# Patient Record
Sex: FEMALE | Race: BLACK OR AFRICAN AMERICAN | NOT HISPANIC OR LATINO | Employment: UNEMPLOYED | URBAN - METROPOLITAN AREA
[De-identification: names, ages, dates, MRNs, and addresses within clinical notes are randomized per-mention and may not be internally consistent; named-entity substitution may affect disease eponyms.]

---

## 2023-02-20 LAB
ALANINE AMINOTRANSFERASE (SGPT) (U/L) IN SER/PLAS: 16 U/L (ref 7–45)
ALBUMIN (G/DL) IN SER/PLAS: 4.2 G/DL (ref 3.4–5)
ALKALINE PHOSPHATASE (U/L) IN SER/PLAS: 80 U/L (ref 33–136)
ANION GAP IN SER/PLAS: 10 MMOL/L (ref 10–20)
ASPARTATE AMINOTRANSFERASE (SGOT) (U/L) IN SER/PLAS: 15 U/L (ref 9–39)
BASOPHILS (10*3/UL) IN BLOOD BY AUTOMATED COUNT: 0.01 X10E9/L (ref 0–0.1)
BASOPHILS/100 LEUKOCYTES IN BLOOD BY AUTOMATED COUNT: 0.2 % (ref 0–2)
BILIRUBIN TOTAL (MG/DL) IN SER/PLAS: 0.5 MG/DL (ref 0–1.2)
CALCIUM (MG/DL) IN SER/PLAS: 10.5 MG/DL (ref 8.6–10.6)
CARBON DIOXIDE, TOTAL (MMOL/L) IN SER/PLAS: 31 MMOL/L (ref 21–32)
CHLORIDE (MMOL/L) IN SER/PLAS: 105 MMOL/L (ref 98–107)
CREATININE (MG/DL) IN SER/PLAS: 0.77 MG/DL (ref 0.5–1.05)
EOSINOPHILS (10*3/UL) IN BLOOD BY AUTOMATED COUNT: 0 X10E9/L (ref 0–0.7)
EOSINOPHILS/100 LEUKOCYTES IN BLOOD BY AUTOMATED COUNT: 0 % (ref 0–6)
ERYTHROCYTE DISTRIBUTION WIDTH (RATIO) BY AUTOMATED COUNT: 13.3 % (ref 11.5–14.5)
ERYTHROCYTE MEAN CORPUSCULAR HEMOGLOBIN CONCENTRATION (G/DL) BY AUTOMATED: 33.2 G/DL (ref 32–36)
ERYTHROCYTE MEAN CORPUSCULAR VOLUME (FL) BY AUTOMATED COUNT: 95 FL (ref 80–100)
ERYTHROCYTES (10*6/UL) IN BLOOD BY AUTOMATED COUNT: 4.45 X10E12/L (ref 4–5.2)
GFR FEMALE: 85 ML/MIN/1.73M2
GLUCOSE (MG/DL) IN SER/PLAS: 96 MG/DL (ref 74–99)
HEMATOCRIT (%) IN BLOOD BY AUTOMATED COUNT: 42.2 % (ref 36–46)
HEMOGLOBIN (G/DL) IN BLOOD: 14 G/DL (ref 12–16)
IMMATURE GRANULOCYTES/100 LEUKOCYTES IN BLOOD BY AUTOMATED COUNT: 0.2 % (ref 0–0.9)
LEUKOCYTES (10*3/UL) IN BLOOD BY AUTOMATED COUNT: 5.1 X10E9/L (ref 4.4–11.3)
LYMPHOCYTES (10*3/UL) IN BLOOD BY AUTOMATED COUNT: 1.67 X10E9/L (ref 1.2–4.8)
LYMPHOCYTES/100 LEUKOCYTES IN BLOOD BY AUTOMATED COUNT: 32.8 % (ref 13–44)
MONOCYTES (10*3/UL) IN BLOOD BY AUTOMATED COUNT: 0.48 X10E9/L (ref 0.1–1)
MONOCYTES/100 LEUKOCYTES IN BLOOD BY AUTOMATED COUNT: 9.4 % (ref 2–10)
NEUTROPHILS (10*3/UL) IN BLOOD BY AUTOMATED COUNT: 2.92 X10E9/L (ref 1.2–7.7)
NEUTROPHILS/100 LEUKOCYTES IN BLOOD BY AUTOMATED COUNT: 57.4 % (ref 40–80)
NRBC (PER 100 WBCS) BY AUTOMATED COUNT: 0 /100 WBC (ref 0–0)
PLATELETS (10*3/UL) IN BLOOD AUTOMATED COUNT: 225 X10E9/L (ref 150–450)
POTASSIUM (MMOL/L) IN SER/PLAS: 4.4 MMOL/L (ref 3.5–5.3)
PROTEIN TOTAL: 7.3 G/DL (ref 6.4–8.2)
SODIUM (MMOL/L) IN SER/PLAS: 142 MMOL/L (ref 136–145)
UREA NITROGEN (MG/DL) IN SER/PLAS: 12 MG/DL (ref 6–23)

## 2023-04-17 DIAGNOSIS — E78.5 HYPERLIPIDEMIA, UNSPECIFIED HYPERLIPIDEMIA TYPE: ICD-10-CM

## 2023-04-17 DIAGNOSIS — Z00.00 HEALTHCARE MAINTENANCE: ICD-10-CM

## 2023-04-17 RX ORDER — CELECOXIB 200 MG/1
1 CAPSULE ORAL DAILY
COMMUNITY
Start: 2019-07-24 | End: 2023-04-17 | Stop reason: SDUPTHER

## 2023-04-17 RX ORDER — ATORVASTATIN CALCIUM 40 MG/1
40 TABLET, FILM COATED ORAL DAILY
Qty: 90 TABLET | Refills: 1 | Status: SHIPPED | OUTPATIENT
Start: 2023-04-17 | End: 2023-10-06

## 2023-04-17 RX ORDER — CELECOXIB 200 MG/1
200 CAPSULE ORAL DAILY
Qty: 90 CAPSULE | Refills: 1 | Status: SHIPPED | OUTPATIENT
Start: 2023-04-17 | End: 2023-10-06

## 2023-04-17 RX ORDER — ATORVASTATIN CALCIUM 40 MG/1
1 TABLET, FILM COATED ORAL DAILY
COMMUNITY
Start: 2019-11-01 | End: 2023-04-17 | Stop reason: SDUPTHER

## 2023-08-02 ENCOUNTER — APPOINTMENT (OUTPATIENT)
Dept: PRIMARY CARE | Facility: CLINIC | Age: 68
End: 2023-08-02

## 2023-09-12 ENCOUNTER — TELEMEDICINE (OUTPATIENT)
Dept: PRIMARY CARE | Facility: CLINIC | Age: 68
End: 2023-09-12
Payer: COMMERCIAL

## 2023-09-12 DIAGNOSIS — S13.4XXA WHIPLASH INJURY TO NECK, INITIAL ENCOUNTER: Primary | ICD-10-CM

## 2023-09-12 PROBLEM — F32.A DEPRESSION: Status: ACTIVE | Noted: 2019-02-26

## 2023-09-12 PROBLEM — I10 BENIGN ESSENTIAL HYPERTENSION: Status: ACTIVE | Noted: 2023-09-12

## 2023-09-12 PROBLEM — F17.200 TOBACCO USE DISORDER: Status: ACTIVE | Noted: 2019-02-26

## 2023-09-12 PROBLEM — F90.9 ATTENTION DEFICIT HYPERACTIVITY DISORDER: Status: ACTIVE | Noted: 2019-02-26

## 2023-09-12 PROCEDURE — 99442 PR PHYS/QHP TELEPHONE EVALUATION 11-20 MIN: CPT | Performed by: STUDENT IN AN ORGANIZED HEALTH CARE EDUCATION/TRAINING PROGRAM

## 2023-09-12 RX ORDER — BENZTROPINE MESYLATE 1 MG/1
TABLET ORAL
COMMUNITY

## 2023-09-12 RX ORDER — CYCLOBENZAPRINE HCL 10 MG
TABLET ORAL
COMMUNITY
Start: 2020-12-11 | End: 2023-09-12 | Stop reason: DRUGHIGH

## 2023-09-12 RX ORDER — HALOPERIDOL DECANOATE 100 MG/ML
INJECTION INTRAMUSCULAR
COMMUNITY

## 2023-09-12 RX ORDER — CYCLOBENZAPRINE HCL 10 MG
10 TABLET ORAL 3 TIMES DAILY PRN
Qty: 30 TABLET | Refills: 3 | Status: SHIPPED | OUTPATIENT
Start: 2023-09-12 | End: 2024-01-11 | Stop reason: SDUPTHER
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
## 2023-10-05 DIAGNOSIS — Z00.00 HEALTHCARE MAINTENANCE: ICD-10-CM

## 2023-10-05 DIAGNOSIS — E78.5 HYPERLIPIDEMIA, UNSPECIFIED HYPERLIPIDEMIA TYPE: ICD-10-CM

## 2023-10-06 DIAGNOSIS — E78.5 HYPERLIPIDEMIA, UNSPECIFIED HYPERLIPIDEMIA TYPE: ICD-10-CM

## 2023-10-06 DIAGNOSIS — Z00.00 HEALTHCARE MAINTENANCE: ICD-10-CM

## 2023-10-06 RX ORDER — ATORVASTATIN CALCIUM 40 MG/1
40 TABLET, FILM COATED ORAL DAILY
Qty: 30 TABLET | Refills: 10 | Status: SHIPPED | OUTPATIENT
Start: 2023-10-06 | End: 2023-10-09

## 2023-10-06 RX ORDER — CELECOXIB 200 MG/1
200 CAPSULE ORAL DAILY
Qty: 30 CAPSULE | Refills: 10 | Status: SHIPPED | OUTPATIENT
Start: 2023-10-06 | End: 2023-10-09

## 2023-10-09 RX ORDER — ATORVASTATIN CALCIUM 40 MG/1
40 TABLET, FILM COATED ORAL DAILY
Qty: 90 TABLET | Refills: 3 | Status: SHIPPED | OUTPATIENT
Start: 2023-10-09

## 2023-10-09 RX ORDER — CELECOXIB 200 MG/1
200 CAPSULE ORAL DAILY
Qty: 90 CAPSULE | Refills: 3 | Status: SHIPPED | OUTPATIENT
Start: 2023-10-09

## 2023-10-18 ENCOUNTER — OFFICE VISIT (OUTPATIENT)
Dept: PRIMARY CARE | Facility: CLINIC | Age: 68
End: 2023-10-18
Payer: COMMERCIAL

## 2023-10-18 VITALS
DIASTOLIC BLOOD PRESSURE: 88 MMHG | TEMPERATURE: 97.5 F | OXYGEN SATURATION: 95 % | SYSTOLIC BLOOD PRESSURE: 134 MMHG | WEIGHT: 177 LBS | HEART RATE: 75 BPM | BODY MASS INDEX: 27.72 KG/M2

## 2023-10-18 DIAGNOSIS — S13.4XXA WHIPLASH INJURY TO NECK, INITIAL ENCOUNTER: Primary | ICD-10-CM
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
## 2023-10-18 PROCEDURE — 1125F AMNT PAIN NOTED PAIN PRSNT: CPT | Performed by: STUDENT IN AN ORGANIZED HEALTH CARE EDUCATION/TRAINING PROGRAM

## 2023-10-18 PROCEDURE — 3079F DIAST BP 80-89 MM HG: CPT | Performed by: STUDENT IN AN ORGANIZED HEALTH CARE EDUCATION/TRAINING PROGRAM

## 2023-10-18 PROCEDURE — 4004F PT TOBACCO SCREEN RCVD TLK: CPT | Performed by: STUDENT IN AN ORGANIZED HEALTH CARE EDUCATION/TRAINING PROGRAM

## 2023-10-18 PROCEDURE — 99214 OFFICE O/P EST MOD 30 MIN: CPT | Performed by: STUDENT IN AN ORGANIZED HEALTH CARE EDUCATION/TRAINING PROGRAM

## 2023-10-18 PROCEDURE — 3075F SYST BP GE 130 - 139MM HG: CPT | Performed by: STUDENT IN AN ORGANIZED HEALTH CARE EDUCATION/TRAINING PROGRAM

## 2023-10-18 RX ORDER — METHYLPREDNISOLONE 4 MG/1
TABLET ORAL
Qty: 21 TABLET | Refills: 0 | Status: SHIPPED | OUTPATIENT
Start: 2023-10-18 | End: 2023-10-25
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
## 2023-11-14 ENCOUNTER — ALLIED HEALTH (OUTPATIENT)
Dept: INTEGRATIVE MEDICINE | Facility: CLINIC | Age: 68
End: 2023-11-14

## 2023-11-14 PROCEDURE — MASS(60M) MASSAGE 60 MINUTES: Performed by: MASSAGE THERAPIST
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
## 2023-11-21 ENCOUNTER — ANCILLARY PROCEDURE (OUTPATIENT)
Dept: RADIOLOGY | Facility: CLINIC | Age: 68
End: 2023-11-21
Payer: COMMERCIAL

## 2023-11-21 DIAGNOSIS — M54.16 LUMBAR RADICULOPATHY: ICD-10-CM

## 2023-11-21 PROCEDURE — 72100 X-RAY EXAM L-S SPINE 2/3 VWS: CPT | Performed by: RADIOLOGY

## 2023-11-21 PROCEDURE — 72100 X-RAY EXAM L-S SPINE 2/3 VWS: CPT | Mod: FY

## 2023-12-04 ENCOUNTER — APPOINTMENT (OUTPATIENT)
Dept: INTEGRATIVE MEDICINE | Facility: CLINIC | Age: 68
End: 2023-12-04

## 2023-12-05 ENCOUNTER — TELEMEDICINE (OUTPATIENT)
Dept: INTEGRATIVE MEDICINE | Facility: CLINIC | Age: 68
End: 2023-12-05
Payer: COMMERCIAL

## 2023-12-05 DIAGNOSIS — M54.9 OTHER CHRONIC BACK PAIN: Primary | ICD-10-CM

## 2023-12-05 DIAGNOSIS — G89.29 OTHER CHRONIC BACK PAIN: Primary | ICD-10-CM

## 2023-12-05 PROCEDURE — 99213 OFFICE O/P EST LOW 20 MIN: CPT | Performed by: PHYSICIAN ASSISTANT
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
## 2023-12-11 ENCOUNTER — ALLIED HEALTH (OUTPATIENT)
Dept: INTEGRATIVE MEDICINE | Facility: CLINIC | Age: 68
End: 2023-12-11
Payer: COMMERCIAL

## 2023-12-11 DIAGNOSIS — M54.59 OTHER LOW BACK PAIN: Primary | ICD-10-CM

## 2023-12-11 PROCEDURE — 97810 ACUP 1/> WO ESTIM 1ST 15 MIN: CPT | Performed by: ACUPUNCTURIST

## 2023-12-11 PROCEDURE — 99202 OFFICE O/P NEW SF 15 MIN: CPT | Performed by: ACUPUNCTURIST

## 2023-12-11 PROCEDURE — 97811 ACUP 1/> W/O ESTIM EA ADD 15: CPT | Performed by: ACUPUNCTURIST

## 2023-12-11 ASSESSMENT — ENCOUNTER SYMPTOMS: BACK PAIN: 1
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
## 2023-12-12 ENCOUNTER — ALLIED HEALTH (OUTPATIENT)
Dept: INTEGRATIVE MEDICINE | Facility: CLINIC | Age: 68
End: 2023-12-12

## 2023-12-12 PROCEDURE — MASS60G MASSAGE 60 MINUTES: Performed by: MASSAGE THERAPIST

## 2023-12-12 NOTE — PROGRESS NOTES
Massage Therapy Visit:     Natalie Moore was self-referred.    Condition of Client Subjective :  Patient ID: Natalie Moore is a 68 y.o. female who presents for reason for visit of rt side shoulder and low back discomfort .    [facility] Sept 2022.    Session Information  Visit Type: Follow-up visit  Description of present complaint: Muscle tension      Objective   Pre-treatment Assessment  Arrival Mode: Ambulatory  Treatment Precautions: None      Actions Assessment/Plan :  Provider reviewed plan for the massage session, precautions and contraindications. Patient/guardian/hospital staff has given consent to treat with full understanding of what to expect during the session. Before massage therapy began, provider explained to the patient to communicate at any time if the pressure was causing discomfort past their tolerance level. Patient agreed to advise therapist.    Massage Treatment  Denies Allergy: Patient denies allergy to topical lubricant  Patient Position: Table, Supine, Prone  Positioning Assistance: Pillow(s)/bolster under knees while supine, Pillow(s)/bolster under anles while prone  Massage Technique: Therapeutic massage  Pressure Scale: 3 - Medium pressure    Response:   Palpated inflammation at left shoulder joint during treatment. Pt noted increased shoulder ROM after treatment.     Evaluation:

## 2024-01-09 ENCOUNTER — ALLIED HEALTH (OUTPATIENT)
Dept: INTEGRATIVE MEDICINE | Facility: CLINIC | Age: 69
End: 2024-01-09
Payer: MEDICARE

## 2024-01-09 DIAGNOSIS — G89.29 OTHER CHRONIC BACK PAIN: ICD-10-CM

## 2024-01-09 DIAGNOSIS — M54.9 OTHER CHRONIC BACK PAIN: ICD-10-CM

## 2024-01-09 PROCEDURE — 97810 ACUP 1/> WO ESTIM 1ST 15 MIN: CPT | Performed by: ACUPUNCTURIST

## 2024-01-09 PROCEDURE — 97811 ACUP 1/> W/O ESTIM EA ADD 15: CPT | Performed by: ACUPUNCTURIST

## 2024-01-09 ASSESSMENT — ENCOUNTER SYMPTOMS: BACK PAIN: 1
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
## 2024-01-11 DIAGNOSIS — S13.4XXA WHIPLASH INJURY TO NECK, INITIAL ENCOUNTER: ICD-10-CM

## 2024-01-11 RX ORDER — CYCLOBENZAPRINE HCL 10 MG
10 TABLET ORAL 3 TIMES DAILY PRN
Qty: 30 TABLET | Refills: 3 | Status: SHIPPED | OUTPATIENT
Start: 2024-01-11 | End: 2024-04-04

## 2024-01-15 ENCOUNTER — APPOINTMENT (OUTPATIENT)
Dept: INTEGRATIVE MEDICINE | Facility: CLINIC | Age: 69
End: 2024-01-15
Payer: MEDICARE

## 2024-01-23 ENCOUNTER — ALLIED HEALTH (OUTPATIENT)
Dept: INTEGRATIVE MEDICINE | Facility: CLINIC | Age: 69
End: 2024-01-23
Payer: MEDICARE

## 2024-01-23 DIAGNOSIS — M54.59 OTHER LOW BACK PAIN: Primary | ICD-10-CM

## 2024-01-23 PROCEDURE — 97811 ACUP 1/> W/O ESTIM EA ADD 15: CPT | Performed by: ACUPUNCTURIST

## 2024-01-23 PROCEDURE — 97810 ACUP 1/> WO ESTIM 1ST 15 MIN: CPT | Performed by: ACUPUNCTURIST

## 2024-01-23 ASSESSMENT — ENCOUNTER SYMPTOMS: BACK PAIN: 1
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
## 2024-01-30 ENCOUNTER — ALLIED HEALTH (OUTPATIENT)
Dept: INTEGRATIVE MEDICINE | Facility: CLINIC | Age: 69
End: 2024-01-30
Payer: MEDICARE

## 2024-01-30 DIAGNOSIS — M54.59 OTHER LOW BACK PAIN: Primary | ICD-10-CM

## 2024-01-30 PROCEDURE — 97810 ACUP 1/> WO ESTIM 1ST 15 MIN: CPT | Performed by: ACUPUNCTURIST

## 2024-01-30 PROCEDURE — 97811 ACUP 1/> W/O ESTIM EA ADD 15: CPT | Performed by: ACUPUNCTURIST

## 2024-01-30 ASSESSMENT — ENCOUNTER SYMPTOMS: BACK PAIN: 1
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
## 2024-02-06 ENCOUNTER — ALLIED HEALTH (OUTPATIENT)
Dept: INTEGRATIVE MEDICINE | Facility: CLINIC | Age: 69
End: 2024-02-06
Payer: MEDICARE

## 2024-02-06 DIAGNOSIS — M54.59 OTHER LOW BACK PAIN: Primary | ICD-10-CM

## 2024-02-06 PROCEDURE — 97810 ACUP 1/> WO ESTIM 1ST 15 MIN: CPT | Performed by: ACUPUNCTURIST

## 2024-02-06 ASSESSMENT — ENCOUNTER SYMPTOMS: BACK PAIN: 1
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
## 2024-02-19 ENCOUNTER — ALLIED HEALTH (OUTPATIENT)
Dept: INTEGRATIVE MEDICINE | Facility: CLINIC | Age: 69
End: 2024-02-19
Payer: MEDICARE

## 2024-02-19 DIAGNOSIS — M54.59 OTHER LOW BACK PAIN: Primary | ICD-10-CM

## 2024-02-19 PROCEDURE — 97810 ACUP 1/> WO ESTIM 1ST 15 MIN: CPT | Performed by: ACUPUNCTURIST

## 2024-02-19 PROCEDURE — 97811 ACUP 1/> W/O ESTIM EA ADD 15: CPT | Performed by: ACUPUNCTURIST

## 2024-02-19 ASSESSMENT — ENCOUNTER SYMPTOMS: BACK PAIN: 1
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
## 2024-02-26 ENCOUNTER — APPOINTMENT (OUTPATIENT)
Dept: INTEGRATIVE MEDICINE | Facility: CLINIC | Age: 69
End: 2024-02-26
Payer: MEDICARE

## 2024-03-04 ENCOUNTER — ALLIED HEALTH (OUTPATIENT)
Dept: INTEGRATIVE MEDICINE | Facility: CLINIC | Age: 69
End: 2024-03-04
Payer: MEDICARE

## 2024-03-04 DIAGNOSIS — M54.59 OTHER LOW BACK PAIN: Primary | ICD-10-CM

## 2024-03-04 PROCEDURE — 97810 ACUP 1/> WO ESTIM 1ST 15 MIN: CPT | Performed by: ACUPUNCTURIST

## 2024-03-04 PROCEDURE — 97811 ACUP 1/> W/O ESTIM EA ADD 15: CPT | Performed by: ACUPUNCTURIST

## 2024-03-04 ASSESSMENT — ENCOUNTER SYMPTOMS: BACK PAIN: 1
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
## 2024-03-25 ENCOUNTER — ALLIED HEALTH (OUTPATIENT)
Dept: INTEGRATIVE MEDICINE | Facility: CLINIC | Age: 69
End: 2024-03-25
Payer: MEDICARE

## 2024-03-25 DIAGNOSIS — M54.59 OTHER LOW BACK PAIN: Primary | ICD-10-CM

## 2024-03-25 PROCEDURE — 97811 ACUP 1/> W/O ESTIM EA ADD 15: CPT | Performed by: ACUPUNCTURIST

## 2024-03-25 PROCEDURE — 97810 ACUP 1/> WO ESTIM 1ST 15 MIN: CPT | Performed by: ACUPUNCTURIST

## 2024-03-25 ASSESSMENT — ENCOUNTER SYMPTOMS: BACK PAIN: 1
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
## 2024-04-03 DIAGNOSIS — S13.4XXA WHIPLASH INJURY TO NECK, INITIAL ENCOUNTER: ICD-10-CM

## 2024-04-04 RX ORDER — CYCLOBENZAPRINE HCL 10 MG
TABLET ORAL
Qty: 30 TABLET | Refills: 10 | Status: SHIPPED | OUTPATIENT
Start: 2024-04-04

## 2024-04-23 ENCOUNTER — APPOINTMENT (OUTPATIENT)
Dept: INTEGRATIVE MEDICINE | Facility: CLINIC | Age: 69
End: 2024-04-23
Payer: MEDICARE

## 2024-04-29 ENCOUNTER — APPOINTMENT (OUTPATIENT)
Dept: INTEGRATIVE MEDICINE | Facility: CLINIC | Age: 69
End: 2024-04-29

## 2024-04-29 ENCOUNTER — APPOINTMENT (OUTPATIENT)
Dept: INTEGRATIVE MEDICINE | Facility: CLINIC | Age: 69
End: 2024-04-29
Payer: MEDICARE

## 2024-05-06 ENCOUNTER — APPOINTMENT (OUTPATIENT)
Dept: INTEGRATIVE MEDICINE | Facility: CLINIC | Age: 69
End: 2024-05-06
Payer: MEDICARE

## 2024-05-06 ENCOUNTER — TELEMEDICINE (OUTPATIENT)
Dept: PRIMARY CARE | Facility: CLINIC | Age: 69
End: 2024-05-06
Payer: MEDICARE

## 2024-05-06 DIAGNOSIS — R53.82 CHRONIC FATIGUE AND MALAISE: Primary | ICD-10-CM

## 2024-05-06 DIAGNOSIS — R53.81 CHRONIC FATIGUE AND MALAISE: Primary | ICD-10-CM

## 2024-05-06 PROCEDURE — 99442 PR PHYS/QHP TELEPHONE EVALUATION 11-20 MIN: CPT | Performed by: STUDENT IN AN ORGANIZED HEALTH CARE EDUCATION/TRAINING PROGRAM

## 2024-05-06 NOTE — PROGRESS NOTES
Subjective   Patient ID: Natalie Moore is a 68 y.o. female who presents for No chief complaint on file..    HPI   Re: malaise - subacute on chronic onset of fatigue and malaise. Reports subjective low grade fever and chills however no documented temperature above 99. Denies coughing. Reports night sweats. She has gained a few pounds, however she tells me she's 177lb and this was her weight back in October of 2023. She has not fallen. She feels weak. No chest pain.     PMHx, FHx, Social Hx, Surg Hx personally reviewed at this appointment. No pertinent findings and/or changes from prior (if applicable).    Review of Systems    Objective   There were no vitals taken for this visit.    Physical Exam  Phone visit    Assessment/Plan   # Malaise and fatigue: chronic  - CBC, CMP, TSH  - HIV, Hepatitis panel, KAVYA, ESR, CRP to check for less common causes  - CXR  - in office appointment so I can perform an exam    10-20 mins

## 2024-05-07 ENCOUNTER — APPOINTMENT (OUTPATIENT)
Dept: INTEGRATIVE MEDICINE | Facility: CLINIC | Age: 69
End: 2024-05-07

## 2024-05-13 ENCOUNTER — APPOINTMENT (OUTPATIENT)
Dept: INTEGRATIVE MEDICINE | Facility: CLINIC | Age: 69
End: 2024-05-13
Payer: MEDICARE

## 2024-05-14 ENCOUNTER — APPOINTMENT (OUTPATIENT)
Dept: INTEGRATIVE MEDICINE | Facility: CLINIC | Age: 69
End: 2024-05-14

## 2024-05-17 ENCOUNTER — LAB (OUTPATIENT)
Dept: LAB | Facility: LAB | Age: 69
End: 2024-05-17
Payer: MEDICARE

## 2024-05-17 ENCOUNTER — HOSPITAL ENCOUNTER (OUTPATIENT)
Dept: RADIOLOGY | Facility: CLINIC | Age: 69
Discharge: HOME | End: 2024-05-17
Payer: MEDICARE

## 2024-05-17 DIAGNOSIS — R53.81 CHRONIC FATIGUE AND MALAISE: ICD-10-CM

## 2024-05-17 DIAGNOSIS — R53.82 CHRONIC FATIGUE AND MALAISE: ICD-10-CM

## 2024-05-17 LAB
ALBUMIN SERPL BCP-MCNC: 4.1 G/DL (ref 3.4–5)
ALP SERPL-CCNC: 89 U/L (ref 33–136)
ALT SERPL W P-5'-P-CCNC: 15 U/L (ref 7–45)
ANION GAP SERPL CALC-SCNC: 12 MMOL/L (ref 10–20)
AST SERPL W P-5'-P-CCNC: 18 U/L (ref 9–39)
BASOPHILS # BLD AUTO: 0.02 X10*3/UL (ref 0–0.1)
BASOPHILS NFR BLD AUTO: 0.5 %
BILIRUB SERPL-MCNC: 0.6 MG/DL (ref 0–1.2)
BUN SERPL-MCNC: 11 MG/DL (ref 6–23)
CALCIUM SERPL-MCNC: 10.1 MG/DL (ref 8.6–10.6)
CHLORIDE SERPL-SCNC: 107 MMOL/L (ref 98–107)
CO2 SERPL-SCNC: 27 MMOL/L (ref 21–32)
CREAT SERPL-MCNC: 0.81 MG/DL (ref 0.5–1.05)
CRP SERPL-MCNC: 0.13 MG/DL
EGFRCR SERPLBLD CKD-EPI 2021: 80 ML/MIN/1.73M*2
EOSINOPHIL # BLD AUTO: 0 X10*3/UL (ref 0–0.7)
EOSINOPHIL NFR BLD AUTO: 0 %
ERYTHROCYTE [DISTWIDTH] IN BLOOD BY AUTOMATED COUNT: 13.5 % (ref 11.5–14.5)
ERYTHROCYTE [SEDIMENTATION RATE] IN BLOOD BY WESTERGREN METHOD: 27 MM/H (ref 0–30)
GLUCOSE SERPL-MCNC: 60 MG/DL (ref 74–99)
HAV IGM SER QL: NONREACTIVE
HBV CORE IGM SER QL: NONREACTIVE
HBV SURFACE AG SERPL QL IA: NONREACTIVE
HCT VFR BLD AUTO: 40.9 % (ref 36–46)
HCV AB SER QL: NONREACTIVE
HGB BLD-MCNC: 13.7 G/DL (ref 12–16)
HIV 1+2 AB+HIV1 P24 AG SERPL QL IA: NONREACTIVE
IMM GRANULOCYTES # BLD AUTO: 0.01 X10*3/UL (ref 0–0.7)
IMM GRANULOCYTES NFR BLD AUTO: 0.2 % (ref 0–0.9)
LYMPHOCYTES # BLD AUTO: 1.76 X10*3/UL (ref 1.2–4.8)
LYMPHOCYTES NFR BLD AUTO: 40.6 %
MCH RBC QN AUTO: 31.8 PG (ref 26–34)
MCHC RBC AUTO-ENTMCNC: 33.5 G/DL (ref 32–36)
MCV RBC AUTO: 95 FL (ref 80–100)
MONOCYTES # BLD AUTO: 0.32 X10*3/UL (ref 0.1–1)
MONOCYTES NFR BLD AUTO: 7.4 %
NEUTROPHILS # BLD AUTO: 2.22 X10*3/UL (ref 1.2–7.7)
NEUTROPHILS NFR BLD AUTO: 51.3 %
NRBC BLD-RTO: 0 /100 WBCS (ref 0–0)
PLATELET # BLD AUTO: 239 X10*3/UL (ref 150–450)
POTASSIUM SERPL-SCNC: 4 MMOL/L (ref 3.5–5.3)
PROT SERPL-MCNC: 7.1 G/DL (ref 6.4–8.2)
RBC # BLD AUTO: 4.31 X10*6/UL (ref 4–5.2)
SODIUM SERPL-SCNC: 142 MMOL/L (ref 136–145)
TSH SERPL-ACNC: 1.53 MIU/L (ref 0.44–3.98)
WBC # BLD AUTO: 4.3 X10*3/UL (ref 4.4–11.3)

## 2024-05-17 PROCEDURE — 36415 COLL VENOUS BLD VENIPUNCTURE: CPT

## 2024-05-17 PROCEDURE — 87389 HIV-1 AG W/HIV-1&-2 AB AG IA: CPT

## 2024-05-17 PROCEDURE — 84443 ASSAY THYROID STIM HORMONE: CPT

## 2024-05-17 PROCEDURE — 86140 C-REACTIVE PROTEIN: CPT

## 2024-05-17 PROCEDURE — 85025 COMPLETE CBC W/AUTO DIFF WBC: CPT

## 2024-05-17 PROCEDURE — 71046 X-RAY EXAM CHEST 2 VIEWS: CPT

## 2024-05-17 PROCEDURE — 86038 ANTINUCLEAR ANTIBODIES: CPT

## 2024-05-17 PROCEDURE — 85652 RBC SED RATE AUTOMATED: CPT

## 2024-05-17 PROCEDURE — 80053 COMPREHEN METABOLIC PANEL: CPT

## 2024-05-17 PROCEDURE — 71046 X-RAY EXAM CHEST 2 VIEWS: CPT | Performed by: STUDENT IN AN ORGANIZED HEALTH CARE EDUCATION/TRAINING PROGRAM

## 2024-05-17 PROCEDURE — 80074 ACUTE HEPATITIS PANEL: CPT

## 2024-05-21 ENCOUNTER — APPOINTMENT (OUTPATIENT)
Dept: INTEGRATIVE MEDICINE | Facility: CLINIC | Age: 69
End: 2024-05-21

## 2024-05-24 LAB — ANA SER QL HEP2 SUBST: NEGATIVE

## 2024-06-05 ENCOUNTER — OFFICE VISIT (OUTPATIENT)
Dept: PRIMARY CARE | Facility: CLINIC | Age: 69
End: 2024-06-05
Payer: MEDICARE

## 2024-06-05 VITALS
HEART RATE: 109 BPM | DIASTOLIC BLOOD PRESSURE: 78 MMHG | SYSTOLIC BLOOD PRESSURE: 124 MMHG | BODY MASS INDEX: 26.47 KG/M2 | WEIGHT: 169 LBS | TEMPERATURE: 97.5 F

## 2024-06-05 DIAGNOSIS — F20.9 SCHIZOPHRENIA, UNSPECIFIED TYPE (MULTI): Chronic | ICD-10-CM

## 2024-06-05 DIAGNOSIS — G47.9 SLEEP DISTURBANCE: ICD-10-CM

## 2024-06-05 DIAGNOSIS — R53.81 CHRONIC FATIGUE AND MALAISE: ICD-10-CM

## 2024-06-05 DIAGNOSIS — R53.82 CHRONIC FATIGUE AND MALAISE: ICD-10-CM

## 2024-06-05 DIAGNOSIS — R29.818 SUSPECTED SLEEP APNEA: Primary | ICD-10-CM

## 2024-06-05 PROCEDURE — 3078F DIAST BP <80 MM HG: CPT | Performed by: STUDENT IN AN ORGANIZED HEALTH CARE EDUCATION/TRAINING PROGRAM

## 2024-06-05 PROCEDURE — 99214 OFFICE O/P EST MOD 30 MIN: CPT | Performed by: STUDENT IN AN ORGANIZED HEALTH CARE EDUCATION/TRAINING PROGRAM

## 2024-06-05 PROCEDURE — 1160F RVW MEDS BY RX/DR IN RCRD: CPT | Performed by: STUDENT IN AN ORGANIZED HEALTH CARE EDUCATION/TRAINING PROGRAM

## 2024-06-05 PROCEDURE — 1158F ADVNC CARE PLAN TLK DOCD: CPT | Performed by: STUDENT IN AN ORGANIZED HEALTH CARE EDUCATION/TRAINING PROGRAM

## 2024-06-05 PROCEDURE — 1123F ACP DISCUSS/DSCN MKR DOCD: CPT | Performed by: STUDENT IN AN ORGANIZED HEALTH CARE EDUCATION/TRAINING PROGRAM

## 2024-06-05 PROCEDURE — 1159F MED LIST DOCD IN RCRD: CPT | Performed by: STUDENT IN AN ORGANIZED HEALTH CARE EDUCATION/TRAINING PROGRAM

## 2024-06-05 PROCEDURE — 3074F SYST BP LT 130 MM HG: CPT | Performed by: STUDENT IN AN ORGANIZED HEALTH CARE EDUCATION/TRAINING PROGRAM

## 2024-06-05 NOTE — PROGRESS NOTES
Subjective   Patient ID: Natalie Moore is a 68 y.o. female who presents for No chief complaint on file..    HPI   Please refer to my previous notes for full details. Briefly, here today for follow up on her fatigue. Her lab work and imaging were reassuring. She tells me that if she's not actively doing something, she'll get so tired she'll fall asleep. Not sleeping behind the wheel.  She tells me that she snores heavily at night, and she doesn't feel refreshed in the mornings. She has not had any formal sleep testing. She would also like to get in with our sleep team.    PMHx, FHx, Social Hx, Surg Hx personally reviewed at this appointment. No pertinent findings and/or changes from prior (if applicable).    ROS: Denies wt gain/loss f/c HA LoC CP SOB NVDC. See HPI above, and scanned sheet (if applicable). All other systems are reviewed and are without complaint.     Review of Systems    Objective   /78   Pulse 109   Temp 36.4 °C (97.5 °F)   Wt 76.7 kg (169 lb)   BMI 26.47 kg/m²     Physical Exam  Gen: well developed in NAD. AAO x3.  HEENT: NC/AT. Anicteric sclera, symmetric pupils. MMM no thrush.  Neck: Soft, supple. No LAD. No goiter.   CV: RRR nl s1s2 no m/r/g  Pulm: CTAB no w/r/r, good air exchange  GI: soft NTND BS+ no hsm  Ext: WWP no edema  Neuro: II-XII grossly intact, nonfocal systemic findings  MSK: 5/5 strength b/l UE and LE  Gait: unremarkable    Lab Results   Component Value Date    WBC 4.3 (L) 05/17/2024    HGB 13.7 05/17/2024    HCT 40.9 05/17/2024     05/17/2024    CHOL 149 08/31/2021    TRIG 64 08/31/2021    HDL 40.3 08/31/2021    ALT 15 05/17/2024    AST 18 05/17/2024     05/17/2024    K 4.0 05/17/2024     05/17/2024    CREATININE 0.81 05/17/2024    BUN 11 05/17/2024    CO2 27 05/17/2024    TSH 1.53 05/17/2024    HGBA1C 5.4 01/24/2019     par     Assessment/Plan   # Schizophrenia: controlled  - managed by psych (Dr. Alvarenga)    # Fatigue, Suspected Sleep Apnea  -  Sleep study ordered   - referral to sleep medicine  - labs reviewed in great length today

## 2024-06-05 NOTE — PATIENT INSTRUCTIONS
Your blood work is up to date; no need for additional draw at this appointment    I have placed an order for a sleep study. Please call [phone] to arrange/expedite your study.     I have referred you to a sleep specialist to help you with your sleeping issues. Please call [phone] for the sleep team, and/or [phone] for a sleep psychologist.     Further recommendations will be made based on test results and how you fare clinically.

## 2024-07-08 ENCOUNTER — PROCEDURE VISIT (OUTPATIENT)
Dept: SLEEP MEDICINE | Facility: HOSPITAL | Age: 69
End: 2024-07-08
Payer: MEDICARE

## 2024-07-08 DIAGNOSIS — R29.818 SUSPECTED SLEEP APNEA: ICD-10-CM

## 2024-07-08 DIAGNOSIS — G47.9 SLEEP DISTURBANCE: ICD-10-CM

## 2024-07-08 PROCEDURE — 95806 SLEEP STUDY UNATT&RESP EFFT: CPT | Performed by: INTERNAL MEDICINE

## 2024-08-26 ENCOUNTER — APPOINTMENT (OUTPATIENT)
Dept: SLEEP MEDICINE | Facility: HOSPITAL | Age: 69
End: 2024-08-26
Payer: MEDICARE

## 2024-08-30 DIAGNOSIS — Z00.00 HEALTHCARE MAINTENANCE: ICD-10-CM

## 2024-08-30 DIAGNOSIS — E78.5 HYPERLIPIDEMIA, UNSPECIFIED HYPERLIPIDEMIA TYPE: ICD-10-CM

## 2024-09-01 RX ORDER — ATORVASTATIN CALCIUM 40 MG/1
40 TABLET, FILM COATED ORAL DAILY
Qty: 30 TABLET | Refills: 10 | Status: SHIPPED | OUTPATIENT
Start: 2024-09-01

## 2024-09-01 RX ORDER — CELECOXIB 200 MG/1
200 CAPSULE ORAL DAILY
Qty: 30 CAPSULE | Refills: 10 | Status: SHIPPED | OUTPATIENT
Start: 2024-09-01

## 2024-09-11 ENCOUNTER — HOSPITAL ENCOUNTER (EMERGENCY)
Facility: HOSPITAL | Age: 69
Discharge: HOME | End: 2024-09-11
Payer: MEDICARE

## 2024-09-11 ENCOUNTER — OFFICE VISIT (OUTPATIENT)
Dept: URGENT CARE | Age: 69
End: 2024-09-11
Payer: MEDICARE

## 2024-09-11 VITALS
BODY MASS INDEX: 27.57 KG/M2 | HEART RATE: 101 BPM | TEMPERATURE: 98.3 F | SYSTOLIC BLOOD PRESSURE: 114 MMHG | WEIGHT: 176 LBS | DIASTOLIC BLOOD PRESSURE: 75 MMHG

## 2024-09-11 DIAGNOSIS — N61.1 ABSCESS OF BREAST: Primary | ICD-10-CM

## 2024-09-11 PROCEDURE — 4500999001 HC ED NO CHARGE

## 2024-09-11 RX ORDER — MUPIROCIN CALCIUM 20 MG/G
CREAM TOPICAL
Qty: 30 G | Refills: 0 | Status: SHIPPED | OUTPATIENT
Start: 2024-09-11 | End: 2025-09-11

## 2024-09-11 RX ORDER — DOXYCYCLINE 100 MG/1
100 CAPSULE ORAL 2 TIMES DAILY
Qty: 20 CAPSULE | Refills: 0 | Status: SHIPPED | OUTPATIENT
Start: 2024-09-11 | End: 2024-09-21

## 2024-09-11 ASSESSMENT — PATIENT HEALTH QUESTIONNAIRE - PHQ9
2. FEELING DOWN, DEPRESSED OR HOPELESS: NOT AT ALL
SUM OF ALL RESPONSES TO PHQ9 QUESTIONS 1 AND 2: 0
1. LITTLE INTEREST OR PLEASURE IN DOING THINGS: NOT AT ALL

## 2024-09-11 ASSESSMENT — PAIN SCALES - GENERAL: PAINLEVEL: 8

## 2024-09-11 NOTE — PROGRESS NOTES
HPI:  Pt states that for the past 3 days she has swelling/pain under right breast.  No discharge from the area.  No fever/chills.  No n/v/diarrhea.  No CP or SOB.  Pt states that she had abscess in the same area 30 years ago that has to be drained in the OR.  Pt states that she was doing warm compress to the area.  Pt did not try any meds.  Pain is 6/10 in severity.  Pt declines a chaperone. Pt had doxy before with no GI issues.        ROS:  No fever/chills  No n/v  No cp or sob    PE:  A&O x3  NCAT  EOMI  RRR  CTAB  +area of erythema/swelling/tndop under right breast w/o fluctuance or discharge  No axillary lymphadenopathy on the right  MOEx4  No focal deficit    A/P:  Right Breast Abscess    Continue with warm compress to the area.  Tylenol as needed for pain.  Eat yogurt and take probiotics when on medication.  Keep a diary of symptoms.  Recheck with your doctor in 3-4 days if no improvement.  Go to the ER if starts getting worse.           
Pt c/o acess on right reast since Monday.   
Yes

## 2024-10-28 NOTE — PROGRESS NOTES
[facility]  [facility]  [address]  [facility] [address]  [phone]     [facility]  New Visit Note      Subjective   Patient ID: Natalie Moore is a 69 y.o. female with past medical history significant for Hypertension, Attention deficit disorder, unspecified, Depression, Schizophrenia, Nicotine dependence, and Sleep disorder breathing.    11/4/2024: The patient is here alone today and was referred by PCP Karri Horn MD, for comprehensive sleep medicine evaluation due to sleep apnea recently diagnosed and fatigue. She reports that if she is not actively doing something, she'll get so tired she'll fall asleep but not sleep behind the wheel. She reports that she snores heavily at night and doesn't feel refreshed in the mornings. The patient came to the clinic to review her sleep study and treat her sleep apnea. The [employer]ston, provided her with a P30 I medium mask to try in the clinic, and she reports being comfortable with it. The desensitization strategy, 30-day free mask return policy, and insurance requirements are all discussed with the patient. The patient verbalized understanding it. Her ESS is 13, and her LETICIA is 22  today.    HPI  Patient had been having these symptoms for the past 50 years. Patient had Home Sleep Study in 2024 which showed HIMA but no CPAP started yet.      SLEEP STUDY HISTORY: (personally reviewed raw data such as interpretation report, data sheet, hypnogram, and titration table if available and applicable)  7/8/24: Home Sleep Study: BMI: 30.58, AHI 3%: 19.5/hr, Supine AHI 3%: 25.3/hr, AHI 4%: 15.3/hr, Supine AHI 4%: 19.4/hr, <88%: 7.8 minutes, Roddy: 68.3%, consider 5-16 CWP    SLEEP-WAKE SCHEDULE  Bedtime: 8 PM/1AM on weekdays, same on weekends  Subjective sleep latency: 1 hour  Problems falling asleep: Yes  Number of awakenings: 2-3 times  per night spontaneously for unknown reasons (1 for BR)  Falls back asleep in 30-60  minutes  Problems staying asleep: Yes  Final wake time: 8 AM on weekdays, same on weekends  Shift work: No  Naps: Yes, 1 and 1/2 hours  Feels rested after a nap: No   Average sleep duration (excluding naps): 4-5 hours    SLEEP ENVIRONMENT  Sleep location: bed  Sleep status: sleeps alone  Room is dark:  Yes  Room is quiet: Yes  Room is cool: Yes  Bed comfort: good    SLEEP HABITS:   Activities before bedtime: watch TV  Activities in bed: no  Preferred sleep position: side    SLEEP ROS:  Night symptoms: POSITIVE for snoring, witnessed apnea, and mouth breathing  Morning symptoms: POSITIVE for unrefreshing sleep  Daytime symptoms POSITIVE for excessive daytime sleepiness, fatigue, trouble remembering things in daytime, and trouble staying focused in daytime  Hypersomnia / narcolepsy symptoms: Patient denies symptoms of a hypersomnolence disorder such as sleep paralysis, sleep-related hallucinations, and cataplexy.   RLS symptoms: Patient admits having urge to move legs that occurs at rest (sitting, getting into bed, or lying n bed), worse in the evening, and relieved temporarily with movement.   Frequency of RLS symptoms: daily  RLS symptoms affect sleep onset: Yes   RLS symptoms wakes patient up from sleep: Yes   Current or past treatment for RLS: No  Movements in sleep: Positive for frequent leg kicks / jerks while asleep  Parasomnia symptoms: Patient denies symptoms of parasomnia such as sleepwalking, sleeptalking, sleep-eating, acting out dreams, and nightmares.     WEIGHT: gained 10 lbs in 6 months     REVIEW OF SYSTEMS: All other systems have been reviewed and are negative.    PERTINENT SOCIAL HISTORY:  Occupation: No  Smoking: No   ETOH: No   Marijuana: No   Caffeine: Yes  Sleep aids: No   Claustrophobia: No     PERTINENT PAST SURGICAL HISTORY:  non-contributory    PERTINENT FAMILY HISTORY:  Patient denies family history of any  sleep disorder.    Active Problems, Allergy List, Medication List, and PMH/PSH/FH/Social Hx have been reviewed and reconciled in chart. No significant changes unless documented in the pertinent chart section. Updates made when necessary.       Objective   Vitals:    11/04/24 1303   BP: 106/67   Pulse: 96   Temp: 35.6 °C (96.1 °F)   SpO2: 94%      REVIEW OF SYSTEMS  All other systems have been reviewed and are negative.    ALLERGIES  Allergies   Allergen Reactions    Amoxicillin Diarrhea    Tetracycline GI Upset       MEDICATIONS  Current Outpatient Medications   Medication Sig Dispense Refill    atorvastatin (Lipitor) 40 mg tablet TAKE 1 TABLET BY MOUTH ONCE DAILY 30 tablet 10    benztropine (Cogentin) 1 mg tablet TAKE 1 TABLET BY MOUTH TWICE DAILY. WITH 0.5MG TABLET = TOTAL 1.5 MG TWICE A DAY      celecoxib (CeleBREX) 200 mg capsule TAKE 1 CAPSULE BY MOUTH ONCE DAILY 30 capsule 10    cyclobenzaprine (Flexeril) 10 mg tablet TAKE 1 TABLET BY MOUTH THREE TIMES DAILY AS NEEDED FOR MUSCLE SPASM(S) 30 tablet 10    haloperidol decanoate (Haldol Decanoate) 100 mg/mL injection INJECT 2 ML INTRAMUSCULARLY EVERY (14) DAYS      mupirocin (Bactroban) 2 % cream Apply small amount to affected area 3 times daily 30 g 0    valbenazine tosylate (Ingrezza) 40 mg capsule Take 1 capsule (40 mg) by mouth once daily at bedtime.       No current facility-administered medications for this visit.         Physical Exam  Constitutional:alert and oriented to time, place, and person  Sinus: - tenderness to palpation  Palate:  Narrow Mallampati 2, - Tongue scalloping, + macroglossia  Lungs: Clear to auscultation bilateral, no rales  Heart: Regular rate and rhythm, no murmurs    Assessment/Plan   Natalie Moore is a 69 y.o. female who is seen to evaluate for moderate/mild obstructive sleep apnea. The pathophysiology of sleep apnea, diagnostic testing (HST vs PSG), treatment options (PAP, oral appliance, surgery, hypoglossal nerve stimulator  called Inspire), and supportive management (weight loss, positional therapy, smoking cessation, avoidance of alcohol and sedatives) were discussed with the patient in detail. Risk factors of sleep apnea as well as cardiometabolic and neurocognitive sequelae associated with untreated sleep apnea were also discussed. Lastly, patient was advised to avoid driving vehicle or operating heavy machinery when sleepy.     Natalie Moore with the following problems:     # OBSTRUCTIVE SLEEP APNEA : MODERATE  -Start 5-15 cmH20 auto CPAP  with P30 I medium mask through [employer].  -Sleep apnea and PAP therapy education were provided at length in the clinic today. Natalie Moore verbalized understanding.  -Emphasized diet, exercise, and weight loss in the clinic, as were non-supine sleep, avoiding alcohol in the late evening, and driving or operating heavy machinery when sleepy.  -Natalie Mooreverbalizes understanding of the above instructions and risks.    # CHRONIC SLEEP ONSET/ SLEEP MAINTENANCE INSOMNIA:  -likely due to poor sleep hygiene, irregular sleep schedule, untreated sleep apnea, and Restless Legs Syndrome  -LETICIA: 22  -Sleep hygiene discuss in the clinic.    # SCHIZOPHRENIA:   -Natalie Aguilera taking HALDOL injection.  -Denies HI/SI     # HYPERTENSION:  -Blood pressure was controlled today. To control the blood pressure better, instruct the patient to take anti-hypertension medication at bedtime and a water pill in the waking time.  -Denies headache, palpitation, and syncope in the clinic.  -Follows with PCP/ Cardiology     # OVERWEIGHT:  -with a BMI of 29.7. Natalie Moore most recent Bicarbonate was 27  Bicarbonate   Date Value Ref Range Status   05/17/2024 27 21 - 32 mmol/L Final   -Encourage to have regular exercise to manage weight well.    # XEROSTOMIA:  -Instruct Natalie Mooreto purchase the Biotene gel to ease the dry mouth symptom,     # RESTLESS LEG SYNDROME: This occurs  frequently.  -will reevaluate it after treatment     RTC 1 month after receiving CPAP       All of patient's questions were answered. She verbalizes understanding and agreement with my assessment and plan.

## 2024-11-04 ENCOUNTER — OFFICE VISIT (OUTPATIENT)
Dept: SLEEP MEDICINE | Facility: HOSPITAL | Age: 69
End: 2024-11-04
Payer: MEDICARE

## 2024-11-04 VITALS
SYSTOLIC BLOOD PRESSURE: 106 MMHG | WEIGHT: 173 LBS | HEART RATE: 96 BPM | OXYGEN SATURATION: 94 % | DIASTOLIC BLOOD PRESSURE: 67 MMHG | HEIGHT: 64 IN | TEMPERATURE: 96.1 F | BODY MASS INDEX: 29.53 KG/M2

## 2024-11-04 DIAGNOSIS — F17.200 TOBACCO USE DISORDER: ICD-10-CM

## 2024-11-04 DIAGNOSIS — I10 BENIGN ESSENTIAL HYPERTENSION: ICD-10-CM

## 2024-11-04 DIAGNOSIS — G47.33 OBSTRUCTIVE SLEEP APNEA (ADULT) (PEDIATRIC): Primary | ICD-10-CM

## 2024-11-04 DIAGNOSIS — F32.A DEPRESSION, UNSPECIFIED DEPRESSION TYPE: ICD-10-CM

## 2024-11-04 DIAGNOSIS — G47.30 SLEEP DISORDER BREATHING: ICD-10-CM

## 2024-11-04 DIAGNOSIS — R29.818 SUSPECTED SLEEP APNEA: ICD-10-CM

## 2024-11-04 DIAGNOSIS — G25.81 RLS (RESTLESS LEGS SYNDROME): ICD-10-CM

## 2024-11-04 DIAGNOSIS — G47.9 SLEEP DISTURBANCE: ICD-10-CM

## 2024-11-04 DIAGNOSIS — F20.9 SCHIZOPHRENIA, UNSPECIFIED TYPE: Chronic | ICD-10-CM

## 2024-11-04 PROCEDURE — 3074F SYST BP LT 130 MM HG: CPT

## 2024-11-04 PROCEDURE — 1159F MED LIST DOCD IN RCRD: CPT

## 2024-11-04 PROCEDURE — 99204 OFFICE O/P NEW MOD 45 MIN: CPT

## 2024-11-04 PROCEDURE — 1126F AMNT PAIN NOTED NONE PRSNT: CPT

## 2024-11-04 PROCEDURE — 3008F BODY MASS INDEX DOCD: CPT

## 2024-11-04 PROCEDURE — 99214 OFFICE O/P EST MOD 30 MIN: CPT

## 2024-11-04 PROCEDURE — 3078F DIAST BP <80 MM HG: CPT

## 2024-11-04 PROCEDURE — 1160F RVW MEDS BY RX/DR IN RCRD: CPT

## 2024-11-04 PROCEDURE — 1036F TOBACCO NON-USER: CPT

## 2024-11-04 SDOH — ECONOMIC STABILITY: FOOD INSECURITY: WITHIN THE PAST 12 MONTHS, YOU WORRIED THAT YOUR FOOD WOULD RUN OUT BEFORE YOU GOT MONEY TO BUY MORE.: NEVER TRUE

## 2024-11-04 SDOH — ECONOMIC STABILITY: FOOD INSECURITY: WITHIN THE PAST 12 MONTHS, THE FOOD YOU BOUGHT JUST DIDN'T LAST AND YOU DIDN'T HAVE MONEY TO GET MORE.: NEVER TRUE

## 2024-11-04 ASSESSMENT — SLEEP AND FATIGUE QUESTIONNAIRES
SLEEP_PROBLEM_INTERFERES_DAILY_ACTIVITIES: MUCH
SATISFACTION_WITH_CURRENT_SLEEP_PATTERN: VERY DISSATISFIED
HOW LIKELY ARE YOU TO NOD OFF OR FALL ASLEEP WHILE SITTING AND READING: MODERATE CHANCE OF DOZING
WAKING_TOO_EARLY: SEVERE
HOW LIKELY ARE YOU TO NOD OFF OR FALL ASLEEP WHILE WATCHING TV: HIGH CHANCE OF DOZING
DIFFICULTY_FALLING_ASLEEP: SEVERE
HOW LIKELY ARE YOU TO NOD OFF OR FALL ASLEEP WHILE SITTING AND TALKING TO SOMEONE: WOULD NEVER DOZE
SLEEP_PROBLEM_NOTICEABLE_TO_OTHERS: MUCH
HOW LIKELY ARE YOU TO NOD OFF OR FALL ASLEEP WHEN YOU ARE A PASSENGER IN A CAR FOR AN HOUR WITHOUT A BREAK: SLIGHT CHANCE OF DOZING
DIFFICULTY_STAYING_ASLEEP: SEVERE
HOW LIKELY ARE YOU TO NOD OFF OR FALL ASLEEP WHILE SITTING QUIETLY AFTER LUNCH WITHOUT ALCOHOL: HIGH CHANCE OF DOZING
SITING INACTIVE IN A PUBLIC PLACE LIKE A CLASS ROOM OR A MOVIE THEATER: SLIGHT CHANCE OF DOZING
HOW LIKELY ARE YOU TO NOD OFF OR FALL ASLEEP WHILE LYING DOWN TO REST IN THE AFTERNOON WHEN CIRCUMSTANCES PERMIT: HIGH CHANCE OF DOZING
HOW LIKELY ARE YOU TO NOD OFF OR FALL ASLEEP IN A CAR, WHILE STOPPED FOR A FEW MINUTES IN TRAFFIC: WOULD NEVER DOZE
WORRIED_DISTRESSED_DUE_TO_SLEEP: MUCH
ESS-CHAD TOTAL SCORE: 13

## 2024-11-04 ASSESSMENT — LIFESTYLE VARIABLES
SKIP TO QUESTIONS 9-10: 1
HOW OFTEN DO YOU HAVE SIX OR MORE DRINKS ON ONE OCCASION: NEVER
HOW OFTEN DO YOU HAVE A DRINK CONTAINING ALCOHOL: NEVER
AUDIT-C TOTAL SCORE: 0
HOW MANY STANDARD DRINKS CONTAINING ALCOHOL DO YOU HAVE ON A TYPICAL DAY: PATIENT DOES NOT DRINK

## 2024-11-04 ASSESSMENT — PAIN SCALES - GENERAL: PAINLEVEL_OUTOF10: 0-NO PAIN

## 2024-11-04 NOTE — PATIENT INSTRUCTIONS
[facility] Sleep Medicine  [facility]  [address]  [facility] [id number] [phone]       Thank you for coming to the Sleep Medicine Clinic today! Your sleep medicine provider today was: ALCIRA Flores Below is a summary of your treatment plan, patient education, other important information, and our contact numbers.    Dear Ms Natalie Moore       Your Sleep Provider Today: ALCIRA Flores  Your Primary Care Physician: Karri Horn MD   Your Referring Provider: Karri Horn MD    Diagnosis: OBSTRUCTIVE SLEEP APNEA       Thank you for visiting  Sleep Medicine Clinic !     1. According to your symptom and sleep study report. I will order the new PAP device for you to control your sleep apnea, feel free to contact your DME.   If [employer] is your DME, you can reach them at [phone].   2. Please do not drive when you are sleepy and  start practicing the sleep hygiene as discussed in clinic.    3. Please start using Biotene to ease your dry mouth if needed.    4. FOR QUESTIONS AND CONCERNS:   a) : In case of problems with machine or mask interface, please contact your DME company first. DME is the company who provides you the machine and/or PAP supplies / accessories. If [employer] is your DME, you can reach them at [phone].   b): Please call my office with issues or questions: [phone] ([place]); [phone] ([facility]); [phone] ([place])    If you have a CPAP or BiPAP machine at home, please bring the unit and all accessories including the power cord to your appointments unless I tell you otherwise. Please have knowledge of the DME company you worked with to receive your PAP device. If you have copies of any previous sleep testing completed outside of , please bring with you to clinic as well. This information will make our visits more productive.     If you are new to  CPAP or BiPAP, please note the minimum usage insurance requires to continuing coverage for the equipment as noted by your DME company. Please discuss equipment issues (PAP unit, mask fit, humidification, etc.) with your DME company first.       In the event that you are running more than 10 minutes late to your appointment, I will kindly ask you to reschedule. Thanks.      TREATMENT PLAN       Follow-up Appointment:   Follow-up in 31 days after getting new machine.    PATIENT EDUCATION     OBSTRUCTIVE SLEEP APNEA (HIMA) is a sleep disorder where your upper airway muscles relax during sleep and the airway intermittently and repetitively narrows and collapses leading to partially blocked airway (hypopnea) or completely blocked airway (apnea) which, in turn, can disrupt breathing in sleep, lower oxygen levels while you sleep and cause night time wakings. Because both apnea and hypopnea may cause higher carbon dioxide or low oxygen levels, untreated HIMA can lead to heart arrhythmia, elevation of blood pressure, and make it harder for the body to consolidate memory and facilitate metabolism (leading to higher blood sugars at night). Frequent partial arousals occur during sleep resulting in sleep deprivation and daytime sleepiness. HIMA is associated with an increased risk of cardiovascular disease, stroke, hypertension, and insulin resistance. Moreover, untreated HIMA with excessive daytime sleepiness can increase the risk of motor vehicular accidents.    Below are conservative strategies for HIMA regardless of HIMA severity are:   Positional therapy - Avoid sleeping on your back.   Healthy diet and regular exercise to optimize weight is highly encouraged.   Avoid alcohol late in the evening and sedative-hypnotics as these substances can make sleep apnea worse.   Improve breathing through the nose with intranasal steroid spray, saline rinse, or antihistamines    Safety: Avoid driving vehicle and operating heavy equipment  while sleepy. Drowsy driving may lead to life-threatening motor vehicle accidents. A person driving while sleepy is 5 times more likely to have an accident. If you feel sleepy, pull over and take a short power nap (sleep for less than 30 minutes). Otherwise, ask somebody to drive you.    Treatment options for sleep apnea include weight management, positional therapy, Positive Airway Therapy (PAP) therapy, oral appliance therapy, hypoglossal nerve stimulator (Inspire) and select airway surgeries.    Starting Positive Airway Pressure (PAP): You were ordered a device to wear when you sleep called PAP (Positive Airway Pressure) to treat your sleep apnea. The order will be submitted to a durable medical equipment (DME) company who will arrange setting you up with the device. They will provide all the necessary equipment and discuss use and maintenance of the device with you as well as mask fitting and process of replacing / renewing PAP supplies or accessories. Once you get the machine, please start using it immediately. You may not be successful right away and that is okay. Didi be certain that you keep trying nightly and reach out to DME if you are struggling or having issues with machine usage.     *Please follow-up with me in 1-2 months of starting CPAP to see how well it is working for you and to do some troubleshooting if needed. Also, please bring all PAP equipment with you to follow up appointments unless told otherwise.     Important things to keep in mind as you start PAP:  Insurance will monitor your usage during the first 90 days. You should use your PAP - all night, every night, and including all naps (especially if naps are more than 30 minutes) for your health. The bare minimum is to use your PAP device while sleeping for at least 4 hours per day at least 5-6 days per week.. Otherwise, your PAP device will be reclaimed by your DME company at 90 days.  There are many masks to choose from to wear with your  PAP machine. If you are not comfortable with the first mask issued to you, call your DME company and ask for another option to try. You typically have a 30-day mask guarantee from the day you received your machine.   Discuss with your provider if you are having issues breathing with the machine or if the temperature or humidity feel uncomfortable.  Expect to have an adjustment period when you start your device. It helps to continuing wearing the machine every day for a period of time until you get more used to it. You can practice with wearing the mask alone if you need, then add in the PAP air pressure a few days later.   Reach out for help if you are struggling! The sleep medicine department can be reached at [phone]  We encourage you to download data monitoring apps to your phone. For [employer] 10/11 - [employer] ligia. For [employer] - Dream[employer]. Both apps are available in the Ligia store for free and are a great tool to monitor your progress with your PAP device night to night.    Tips for success with PAP machine usage:  Comfortable and well-fitting mask  Appropriate pressure on the machine  Using humidification  Support from bed partner and clinical team      Maintaining your CPAP/BPAP device:    The humidification chamber (aka water tank or water chamber) needs to be filled with distilled water to prevent buildup of white deposits in the future. If you cannot find distilled water, you can use tap water but expect to have white deposits buildup seen after prolonged use with tap water. If you start seeing white deposits on the water chamber, you can clean it by filling it with equal parts of distilled white vinegar and water. Let the vinegar-water mixture sit for 2 hours, and then rinse it with running tap water. Clean with soap and water then let it dry.     You should try to keep your machine clean in order to work well. Here are some tips to clean PAP supplies / accessories:    Clean  the humidification chamber (aka water tank) as well as your mask and tubing at least once a week with soap and water.   Alternatively, you can fill a sink or basin with warm water and add a little mild detergent, like Ivory dish soap. Gently wipe your supplies with the soapy water to free all the oils and dirt that may have collected. Once that's done, rinse these items with clean water until the soap is gone and let them air dry. You can hang your tubing over the curtain nahomy in your bathroom so that it dries.  The mask insert (part of the mask that has contact with your skin) needs to be cleaned with soap and water daily. Another option is to wipe them down with CPAP wipes or baby wipes.    You should replace your mask and tubing frequently in order to prevent bacteria buildup, machine damage, and mask seal issues. The older the mask and hose, the high likelihood that there is bacteria buildup in it especially if they are not cleaned regularly. Dirty filters damage machines because build-up of dust and contaminants can cause machine to over-heat, and in time, damage the motor of machine. Cushions lose their seal over time as most masks are made of plastic and silicone while headgear is made of neoprene. These materials will break down with age and frequent use. Here is the recommended replacement schedule for PAP supplies / accessories:    Twice a month- disposable filters and cushions for nasal mask or nasal pillows.  Once a month- cushion for full face mask  Every 3 months- mask with headgear and PAP tubing (standard or heated hose)  Every 6 months- reusable filter, water chamber, and chin strap     Other useful information:    Some people do not put water in the tank while other people prefers to put water in the tank to prevent mouth dryness. Try to experiment to determine which is more comfortable for you.   In general, new machines have 2 years warranty on parts while health insurance allows you to have a new  machine once every 5 years.     Common issues with PAP machine:    Mask gets dislodged when turning to the side: Consider getting a CPAP pillow or switching to a mask with hose on top.     Dry mouth:  Your machine has built-in humidifier that heats up the air to prevent dry mouth. It can be adjusted to your comfort. You can try that first and increase setting one level one night at a time to check which setting is comfortable and effective in lessening dry mouth. In some patients with heated hose, adjusting tube temperature to make air warmer can improve dry mouth. If dry mouth persists despite adjusting humidity or tube temperature setting, may apply OTC Biotene gel over the gums at bedtime.  If Biotene gel is not effective, consider trying XEROSTOM gel from [employer].  Also, eliminate or reduce dose of medications that can cause dry mouth if possible. Lastly, may try getting a separate room humidifier machine.    Airleaks: Please call DME as they may need to adjust your mask or refit you with a different kind or different size of mask. In addition, you can ask DME for tips on getting a good mask seal and mask fit.     Difficulty tolerating the mask: Contact your DME to try a different kind of mask and/or call office to get a referral to Sleep Psychologist for CPAP desensitization. CPAP desensitization technique is a set of strategies that helps patient cope with claustrophobia and anxiety related to wearing mask. Alternatively, we can do a daytime mini-sleep study called PAP-nap trial wherein you will try on different kinds of mask and the sleep technician will try different pressure settings on CPAP and BPAP machines to see which specific pressure is tolerable and comfortable for you.     Water droplets or moisture within the hose and/or mask: This is called rain-out and it is caused by condensation of too much heated humidity on the cooler walls of the hose. If you have rain-out, turn down humidity settings or  "get a heated hose. If you already have a heated hose, turn up the \"tube temperature\" of the heated hose. Alternatively, if you don't want to get a heated hose or warmer air, may wrap the CPAP hose with stockings to keep it somewhat warm. Also, you need to place the machine on the floor and lower the hose so that water won't travel upward towards your mask.     PAP desensitization techniques: If you have concerns about something being on your face at night, you can start by getting used to it before trying to sleep with it as follows:      Sit in a comfortable chair or bed. Connect the mask and hose to the CPAP/BPAP machine. Hold the mask on your face (without straps on) and turn on the machine. Practice breathing with the mask on while awake sitting and watching television, reading, or performing a sedentary activity during the day for 5-10 minutes and then take it off.  If tolerated, try again and gradually build up to longer periods of time. If not tolerated, try and try again until it is more comfortable as you become more desensitized. If you are able to use it for at least 20-30 minutes, move unto the next step.     Sit in a comfortable chair or bed. Connect the mask and hose to the CPAP/BPAP machine. Strap the mask on your head and turn on the machine. Practice breathing with the mask and headgear on while awake sitting and watching television, reading, or performing a sedentary activity. Start with 5-10 minutes and gradually increasing time until you can wear it comfortably for at least 20-30 minutes, then move to the next step.    Take a shorter daytime nap with machine turned on while you are in a reclined position in bed, sofa, or recliner. Start with 5-10 minute nap and gradually increase up to 30 minutes. It is not important whether you fall asleep or not. The goal is to rest comfortably with PAP machine on.     Reintroduce PAP machine into nighttime sleep. You can begin using it a portion of the night " and gradually increase up to entire night.     Proceed from one step to the next only when you are completely comfortable. If you feel any anxiety or discomfort, return to the previous step, then proceed again when comfortable.    Expect to “work” with your CPAP/BIPAP unit. It is important to try to relax when beginning CPAP/BIPAP therapy. Inhalation and exhalation should occur through the nose only. If you are unable to consistently breathe this way, do not panic or lose hope. There are other types of masks which allow you to breathe through your nose and/or your mouth. Also, in some patients, using intranasal steroid spray (e.g. Flonase or Nasocort or Fluticasone) 1 hour before bedtime and/or before putting on CPAP mask can help tolerate breathing through the mask.    Don't give up after a few attempts--some patients adjust quickly, while some patients need 3-4 weeks (or sometimes even longer) to be accustomed to CPAP therapy.  Contact your sleep medicine specialist if you have a significant change in weight since this may affect your pressure.    You can also go to the following EDUCATION WEBSITES for further information:   American Academy of Sleep Medicine http://sleepeducation.org  National Sleep Foundation: https://sleepfoundation.org  American Sleep Apnea Association: https://www.sleepapnea.org (for patients with sleep apnea)  Narcolepsy Network: https://www.narcolepsynetwork.org (for patients with narcolepsy)  [employer]: https://www.wakeupnarcolepsy.org (for patients with narcolepsy)  Hypersomnia Foundation: https://www.hypersomniafoundation.org (for patients with idiopathic hypersomnia)  RLS foundation: https://www.rls.org (for patients with restless leg syndrome)    IMPORTANT INFORMATION     Call 911 for medical emergencies.  Our offices are generally open from Monday-Friday, 8 am - 5 pm.   There are no supporting services by either the sleep doctors or their staff on weekends and Holidays, or  after 5 PM on weekdays.   If you need to get in touch with me, you may either call my office number or you can use atCollab.  If a referral for a test, for CPAP, or for another specialist was made, and you have not heard about scheduling this within a week, please call scheduling at [phone] ([phone]).  If you are unable to make your appointment for clinic or an overnight study, kindly call the office or sleep testing center at least 48 hours in advance to cancel and reschedule.  If you are on CPAP, please bring your device's card and/or the device to each clinic appointment.   In case of problems with PAP machine or mask interface, please contact your DME (Durable Medical Equipment) company first. DME is the company who provides you the machine and/or PAP supplies.       PRESCRIPTIONS     We require 7 days advanced notice for prescription refills. If we do not receive the request in this time, we cannot guarantee that your medication will be refilled in time.    IMPORTANT PHONE NUMBERS     Sleep Medicine Clinic Fax: [phone]  Appointments (for Pediatric Sleep Clinic): [phone] - option 1  Appointments (for Adult Sleep Clinic): [phone] - option 2  Appointments (For Sleep Studies): [phone] - option 3  Behavioral Sleep Medicine: [phone]  Sleep Surgery: [phone]  Nutrition Service: [phone]  Weight management clinics with endocrinology: [phone]  Bariatric Services: [phone] (includes weight loss medications and weight loss surgery)  [facility]: [phone] (offers holistic approaches to weight management)  ENT (Otolaryngology): [phone]  Headache Clinic (Neurology): [phone]  Neurology: [phone]  Psychiatry: [phone]  Pulmonary Function Testing (PFT) Center: [phone]  Pulmonary Medicine: [phone]  [employer] (DME): [phone]      OUR SLEEP TESTING LOCATIONS     Our team will contact you to schedule  "your sleep study, however, you can contact us as follow:  Main Phone Line (scheduling only): [phone] ([phone]), option 3  Adult and Pediatric Locations   [place] (6 years and older): [facility] - 4th floor ([address] [facility]) After hours line: [phone]  [facility] (Main campus: All ages): [facility], 6th floor. After hours line: [phone]   [place] (5 years and older; younger considered on case-by-case basis): [address]; [address], [address]. Scheduling  After hours line: [phone]   [place] (6 years and older): [address]; [address]; [address]   [place] (6 years and older): [address] [facility], [address]  After hours line: [phone]   [place] (13 years and older) in [place]: [address], 2nd floor  After hours line: [phone]   [place] (13 year and older): [address]  After hours line: [phone]     Adult Only Locations:   [place] (18 years and older): [address] [facility], 2nd floor   [place] (18 years and older): [address] [facility]; 4th floor  After hours line: [phone]   [place] (18 years and older) at [place]: [address] [facility]  After hours line: [phone]     CONTACTING YOUR SLEEP MEDICINE PROVIDER AND SLEEP TEAM      For issues with your machine or mask interface, please call your DME provider first. DME stands for durable medical company. DME is the company who provides you the machine and/or PAP supplies / accessories.   To schedule, cancel, or reschedule SLEEP STUDY APPOINTMENTS, please call the Main Phone Line at [phone] - option 3.   To schedule, cancel, or reschedule CLINIC APPOINTMENTS, you can do it in \"MyChart\", call [phone] to speak with my  (Nesha Nath), or call the Main Phone Line at [phone]) - option 2  For CLINICAL QUESTIONS or MEDICATION REFILLS, please call direct " "line for Adult Sleep Nurses at [phone].   Lastly, you can also send a message directly to your provider through \"My Chart\", which is the email service through your  Records Account: [email or web address]       Here at [facility], we wish you a restful sleep!   "

## 2025-01-06 NOTE — PROGRESS NOTES
[facility] Medicine  [facility]  [address]  [facility] [address]  [phone]     [facility]  Follow Up Visit Note    Virtual or Telephone Consent    A telephone visit (audio only) between the patient (at the originating site) and the provider (at the distant site) was utilized to provide this telehealth service.   Verbal consent was requested and obtained from Natalie Moore on this date, 01/13/25 for a telehealth visit.         Subjective  Patient ID: Natalie Moore is a 69 y.o. female with past medical history significant for Hypertension, Attention deficit disorder, unspecified, Depression, Schizophrenia, Nicotine dependence, and OBSTRUCTIVE SLEEP APNEA .    1/13/25: UPDATED: The patient had a virtual visit with me to review her initial pap compliance. Her over 4 hours pap compliance rate was  60   %, and Her ESS  is 12  today. She reports having no issue with her pap but sometimes still snoring. She also takes 4 hour nap without using a pap. To get a refreshed sleep, she verbalized understanding of the need to use pap during the nap to refresh her sleep, decrease the nap time, and increase the usage to meet insurance company requirements.       11/4/2024: The patient is here alone today and was referred by PCP Karri Horn MD, for comprehensive sleep medicine evaluation due to sleep apnea recently diagnosed and fatigue. She reports that if she is not actively doing something, she'll get so tired she'll fall asleep but not sleep behind the wheel. She reports that she snores heavily at night and doesn't feel refreshed in the mornings. The patient came to the clinic to review her sleep study and treat her sleep apnea. The [employer] liaison[employer]one, provided her with a P30 I medium mask to try in the clinic, and she reports being comfortable with it. The desensitization strategy, 30-day  free mask return policy, and insurance requirements are all discussed with the patient. The patient verbalized understanding it. Her ESS is 13, and her LETICIA is 22  today.     HPI  Patient had been having these symptoms for the past 50 years. Patient had Home Sleep Study in 2024 which showed HIMA but no CPAP started yet.        SLEEP STUDY HISTORY: (personally reviewed raw data such as interpretation report, data sheet, hypnogram, and titration table if available and applicable)  7/8/24: Home Sleep Study: BMI: 30.58, AHI 3%: 19.5/hr, Supine AHI 3%: 25.3/hr, AHI 4%: 15.3/hr, Supine AHI 4%: 19.4/hr, <88%: 7.8 minutes, Roddy: 68.3%, consider 5-16 CWP     SLEEP-WAKE SCHEDULE after cpap  Bedtime: 8 PM/1AM on weekdays, same on weekends  Subjective sleep latency: decrease to 1/2 -1 hour  Problems falling asleep: better  Number of awakenings: decreased to 0 times after using cpap  Problems staying asleep: No  Final wake time: 8 AM on weekdays, same on weekends  Shift work: No  Naps: Yes, 4 hours  Feels rested after a nap: No   Average sleep duration (excluding naps): 6 hours     SLEEP ENVIRONMENT  Sleep location: bed  Sleep status: sleeps alone  Room is dark:  Yes  Room is quiet: Yes  Room is cool: Yes  Bed comfort: good     SLEEP HABITS:   Activities before bedtime: watch TV  Activities in bed: no  Preferred sleep position: side     SLEEP ROS: (after cpap)  Night symptoms: Improved for snoring, witnessed apnea, and mouth breathing  Morning symptoms: Improved for unrefreshing sleep  Daytime symptoms Improved for excessive daytime sleepiness, fatigue, trouble remembering things in daytime, and trouble staying focused in daytime  Hypersomnia / narcolepsy symptoms: Patient denies symptoms of a hypersomnolence disorder such as sleep paralysis, sleep-related hallucinations, and cataplexy.   RLS symptoms: Patient admits having urge to move legs that occurs at rest (sitting, getting into bed, or lying n bed), worse in the evening,  and relieved temporarily with movement.   Frequency of RLS symptoms: daily  RLS symptoms affect sleep onset: Yes   RLS symptoms wakes patient up from sleep: Yes   Current or past treatment for RLS: No  Movements in sleep: Positive for frequent leg kicks / jerks while asleep  Parasomnia symptoms: Patient denies symptoms of parasomnia such as sleepwalking, sleeptalking, sleep-eating, acting out dreams, and nightmares.      WEIGHT: gained 10 lbs in 6 months      REVIEW OF SYSTEMS: All other systems have been reviewed and are negative.     PERTINENT SOCIAL HISTORY:  Occupation: No  Smoking: No   ETOH: No   Marijuana: No   Caffeine: Yes  Sleep aids: No   Claustrophobia: No      PERTINENT PAST SURGICAL HISTORY:  non-contributory     PERTINENT FAMILY HISTORY:  Patient denies family history of any sleep disorder.     Active Problems, Allergy List, Medication List, and PMH/PSH/FH/Social Hx have been reviewed and reconciled in chart. No significant changes unless documented in the pertinent chart section. Updates made when necessary.       Objective     REVIEW OF SYSTEMS  All other systems have been reviewed and are negative.     ALLERGIES  Allergies        Allergies   Allergen Reactions    Amoxicillin Diarrhea    Tetracycline GI Upset            MEDICATIONS  Current Medications          Current Outpatient Medications   Medication Sig Dispense Refill    atorvastatin (Lipitor) 40 mg tablet TAKE 1 TABLET BY MOUTH ONCE DAILY 30 tablet 10    benztropine (Cogentin) 1 mg tablet TAKE 1 TABLET BY MOUTH TWICE DAILY. WITH 0.5MG TABLET = TOTAL 1.5 MG TWICE A DAY        celecoxib (CeleBREX) 200 mg capsule TAKE 1 CAPSULE BY MOUTH ONCE DAILY 30 capsule 10    cyclobenzaprine (Flexeril) 10 mg tablet TAKE 1 TABLET BY MOUTH THREE TIMES DAILY AS NEEDED FOR MUSCLE SPASM(S) 30 tablet 10    haloperidol decanoate (Haldol Decanoate) 100 mg/mL injection INJECT 2 ML INTRAMUSCULARLY EVERY (14) DAYS        mupirocin (Bactroban) 2 % cream Apply small  amount to affected area 3 times daily 30 g 0    valbenazine tosylate (Ingrezza) 40 mg capsule Take 1 capsule (40 mg) by mouth once daily at bedtime.          No current facility-administered medications for this visit.         Physical Exam  Constitutional:alert and oriented to time, place, and person    PAP Adherence:  DURABLE MEDICAL EQUIPMENT COMPANY: [employer]  Machine: THERAPY: RESMED AIRSENSE 11 PRESSURE SETTINGS: 5 - 15 cm H2O  Mask: Simplus medium   Issues with therapy: ISSUES WITH THERAPY: snoring with pap  Benefits with PAP: PERCEIVED BENEFITS OF PAP: refreshing sleep decreased or no snoring decreased nocturnal awakenings decreased episodes of nocturia falling asleep in a shorter duration of time sleeping for a longer duration of time better sleep quality decreased frequency of dry mouth    A PAP adherence download was obtained and data was reviewed personally today in clinic. (see scanned document in EPIC)           Assessment/Plan  Natalie Moore is a 69 y.o. female who is seen for moderate/mild obstructive sleep apnea. Risk factors of sleep apnea as well as cardiometabolic and neurocognitive sequelae associated with untreated sleep apnea were also discussed. Lastly, patient was advised to avoid driving vehicle or operating heavy machinery when sleepy.      Natalie Hillnes with the following problems:     # OBSTRUCTIVE SLEEP APNEA : MODERATE  -Poor compliance, adjust to 6-116 cmH20 auto CPAP  with P30 I medium mask through [employer]. Will renew supplies next visit.   -Sleep apnea and PAP therapy education were provided at length in the clinic today. Natalie Moore verbalized understanding.  -Emphasized diet, exercise, and weight loss in the clinic, as were non-supine sleep, avoiding alcohol in the late evening, and driving or operating heavy machinery when sleepy.  -Natalie Mooreverbalizes understanding of the above instructions and risks.     # CHRONIC SLEEP ONSET/  SLEEP MAINTENANCE INSOMNIA:  -report better  -Sleep hygiene discuss in the clinic.     # SCHIZOPHRENIA:   -Natalie Aguilera taking HALDOL injection.  -Denies HI/SI     # HYPERTENSION:  -Denies headache, palpitation, and syncope in the clinic.  -Follows with PCP/ Cardiology     # OVERWEIGHT:  -with a BMI of 29.7 ast visit. Natalie Moore most recent Bicarbonate was 27        Bicarbonate   Date Value Ref Range Status   05/17/2024 27 21 - 32 mmol/L Final   -Encourage to have regular exercise to manage weight well.     # XEROSTOMIA:  -Increased the humility to 5  -Instruct Natalie Mooredeisy purchase the Biotene gel to ease the dry mouth symptom,     # RESTLESS LEG SYNDROME:   -will reevaluate it next visit     RTC 1 month after adjusting  pressure setting        All of patient's questions were answered. She verbalizes understanding and agreement with my assessment and plan.

## 2025-01-13 ENCOUNTER — OFFICE VISIT (OUTPATIENT)
Dept: SLEEP MEDICINE | Facility: HOSPITAL | Age: 70
End: 2025-01-13
Payer: COMMERCIAL

## 2025-01-13 DIAGNOSIS — I10 BENIGN ESSENTIAL HYPERTENSION: ICD-10-CM

## 2025-01-13 DIAGNOSIS — F17.200 TOBACCO USE DISORDER: ICD-10-CM

## 2025-01-13 DIAGNOSIS — F20.9 SCHIZOPHRENIA, UNSPECIFIED TYPE: Chronic | ICD-10-CM

## 2025-01-13 DIAGNOSIS — G47.33 OBSTRUCTIVE SLEEP APNEA (ADULT) (PEDIATRIC): Primary | ICD-10-CM

## 2025-01-13 DIAGNOSIS — F32.A DEPRESSION, UNSPECIFIED DEPRESSION TYPE: ICD-10-CM

## 2025-01-13 DIAGNOSIS — G25.81 RLS (RESTLESS LEGS SYNDROME): ICD-10-CM

## 2025-01-13 PROCEDURE — 99213 OFFICE O/P EST LOW 20 MIN: CPT | Mod: GC,95

## 2025-01-13 PROCEDURE — 99213 OFFICE O/P EST LOW 20 MIN: CPT

## 2025-01-13 PROCEDURE — 1036F TOBACCO NON-USER: CPT

## 2025-01-13 PROCEDURE — 1159F MED LIST DOCD IN RCRD: CPT

## 2025-01-13 PROCEDURE — 1160F RVW MEDS BY RX/DR IN RCRD: CPT

## 2025-01-13 ASSESSMENT — SLEEP AND FATIGUE QUESTIONNAIRES
HOW LIKELY ARE YOU TO NOD OFF OR FALL ASLEEP WHILE LYING DOWN TO REST IN THE AFTERNOON WHEN CIRCUMSTANCES PERMIT: HIGH CHANCE OF DOZING
HOW LIKELY ARE YOU TO NOD OFF OR FALL ASLEEP WHILE SITTING AND READING: SLIGHT CHANCE OF DOZING
ESS-CHAD TOTAL SCORE: 12
HOW LIKELY ARE YOU TO NOD OFF OR FALL ASLEEP WHEN YOU ARE A PASSENGER IN A CAR FOR AN HOUR WITHOUT A BREAK: SLIGHT CHANCE OF DOZING
HOW LIKELY ARE YOU TO NOD OFF OR FALL ASLEEP IN A CAR, WHILE STOPPED FOR A FEW MINUTES IN TRAFFIC: SLIGHT CHANCE OF DOZING
SITING INACTIVE IN A PUBLIC PLACE LIKE A CLASS ROOM OR A MOVIE THEATER: SLIGHT CHANCE OF DOZING
HOW LIKELY ARE YOU TO NOD OFF OR FALL ASLEEP WHILE SITTING AND TALKING TO SOMEONE: SLIGHT CHANCE OF DOZING
HOW LIKELY ARE YOU TO NOD OFF OR FALL ASLEEP WHILE WATCHING TV: MODERATE CHANCE OF DOZING
HOW LIKELY ARE YOU TO NOD OFF OR FALL ASLEEP WHILE SITTING QUIETLY AFTER LUNCH WITHOUT ALCOHOL: MODERATE CHANCE OF DOZING

## 2025-01-13 NOTE — PATIENT INSTRUCTIONS
[facility] Sleep Medicine  [facility]  [address]  [facility] [address] [phone]       Thank you for coming to the Sleep Medicine Clinic today! Your sleep medicine provider today was: ALCIRA Flores Below is a summary of your treatment plan, patient education, other important information, and our contact numbers.    Dear Ms Natalie Moore       Your Sleep Provider Today: ALCIRA Flores  Your Primary Care Physician: Karri Horn MD   Your Referring Provider: Iona Mcclellan APRN-CNP    Diagnosis: OBSTRUCTIVE SLEEP APNEA       Thank you for visiting  Sleep Medicine Clinic !     1. According to your symptom and sleep study report. Please continue using your CPAP to control your sleep apnea, feel free to contact your DME.   If [employer] is your DME, you can reach them at [phone].     2. Please do not drive when you are sleepy and continue practicing the sleep hygiene as discussed in clinic.    3. FOR QUESTIONS AND CONCERNS:   a) : In case of problems with machine or mask interface, please contact your DME company first. DME is the company who provides you the machine and/or PAP supplies / accessories. If [employer] is your DME, you can reach them at [phone].   b): Please call my office with issues or questions: [phone] ([place]); [phone] ([facility]); [phone] ([place])    If you have a CPAP or BiPAP machine at home, please bring the unit and all accessories including the power cord to your appointments unless I tell you otherwise. Please have knowledge of the DME company you worked with to receive your PAP device. If you have copies of any previous sleep testing completed outside of , please bring with you to clinic as well. This information will make our visits more productive.     If you are new to CPAP or BiPAP, please note the minimum usage insurance requires to  "continuing coverage for the equipment as noted by your DME company. Please discuss equipment issues (PAP unit, mask fit, humidification, etc.) with your DME company first.       In the event that you are running more than 10 minutes late to your appointment, I will kindly ask you to reschedule. Thanks.      TREATMENT PLAN     - Changed machine settings  via the [employer] today. Please use your machine every night all night.   - Please read the \"Patient Education\" section below for more detailed information. Try implementing tips, reminders, strategies, and supportive management.   - If not yet done, please sign up for [employer] to make a future schedule, send prescription requests, or send messages.    Follow-up Appointment:   1 month, virtual ok    PATIENT EDUCATION     OBSTRUCTIVE SLEEP APNEA (HIMA) is a sleep disorder where your upper airway muscles relax during sleep and the airway intermittently and repetitively narrows and collapses leading to partially blocked airway (hypopnea) or completely blocked airway (apnea) which, in turn, can disrupt breathing in sleep, lower oxygen levels while you sleep and cause night time wakings. Because both apnea and hypopnea may cause higher carbon dioxide or low oxygen levels, untreated HIMA can lead to heart arrhythmia, elevation of blood pressure, and make it harder for the body to consolidate memory and facilitate metabolism (leading to higher blood sugars at night). Frequent partial arousals occur during sleep resulting in sleep deprivation and daytime sleepiness. HIMA is associated with an increased risk of cardiovascular disease, stroke, hypertension, and insulin resistance. Moreover, untreated HIMA with excessive daytime sleepiness can increase the risk of motor vehicular accidents.    Below are conservative strategies for HIMA regardless of HIMA severity are:   Positional therapy - Avoid sleeping on your back.   Healthy diet and regular exercise to optimize " weight is highly encouraged.   Avoid alcohol late in the evening and sedative-hypnotics as these substances can make sleep apnea worse.   Improve breathing through the nose with intranasal steroid spray, saline rinse, or antihistamines    Safety: Avoid driving vehicle and operating heavy equipment while sleepy. Drowsy driving may lead to life-threatening motor vehicle accidents. A person driving while sleepy is 5 times more likely to have an accident. If you feel sleepy, pull over and take a short power nap (sleep for less than 30 minutes). Otherwise, ask somebody to drive you.    Treatment options for sleep apnea include weight management, positional therapy, Positive Airway Therapy (PAP) therapy, oral appliance therapy, hypoglossal nerve stimulator (Inspire) and select airway surgeries.    Starting Positive Airway Pressure (PAP): You were ordered a device to wear when you sleep called PAP (Positive Airway Pressure) to treat your sleep apnea. The order will be submitted to a durable medical equipment (DME) company who will arrange setting you up with the device. They will provide all the necessary equipment and discuss use and maintenance of the device with you as well as mask fitting and process of replacing / renewing PAP supplies or accessories. Once you get the machine, please start using it immediately. You may not be successful right away and that is okay. Didi be certain that you keep trying nightly and reach out to DME if you are struggling or having issues with machine usage.     *Please follow-up with me in 1-2 months of starting CPAP to see how well it is working for you and to do some troubleshooting if needed. Also, please bring all PAP equipment with you to follow up appointments unless told otherwise.     Important things to keep in mind as you start PAP:  Insurance will monitor your usage during the first 90 days. You should use your PAP - all night, every night, and including all naps (especially  if naps are more than 30 minutes) for your health. The bare minimum is to use your PAP device while sleeping for at least 4 hours per day at least 5-6 days per week.. Otherwise, your PAP device will be reclaimed by your [employer] company at 90 days.  There are many masks to choose from to wear with your PAP machine. If you are not comfortable with the first mask issued to you, call your DME company and ask for another option to try. You typically have a 30-day mask guarantee from the day you received your machine.   Discuss with your provider if you are having issues breathing with the machine or if the temperature or humidity feel uncomfortable.  Expect to have an adjustment period when you start your device. It helps to continuing wearing the machine every day for a period of time until you get more used to it. You can practice with wearing the mask alone if you need, then add in the PAP air pressure a few days later.   Reach out for help if you are struggling! The sleep medicine department can be reached at [phone]  We encourage you to download data monitoring apps to your phone. For [employer] 10/11 - S[employer] ligia. For [employer] - DreamMapper. Both apps are available in the [employer] store for free and are a great tool to monitor your progress with your PAP device night to night.    Tips for success with PAP machine usage:  Comfortable and well-fitting mask  Appropriate pressure on the machine  Using humidification  Support from bed partner and clinical team      Maintaining your CPAP/BPAP device:    The humidification chamber (aka water tank or water chamber) needs to be filled with distilled water to prevent buildup of white deposits in the future. If you cannot find distilled water, you can use tap water but expect to have white deposits buildup seen after prolonged use with tap water. If you start seeing white deposits on the water chamber, you can clean it by filling it with equal parts of  distilled white vinegar and water. Let the vinegar-water mixture sit for 2 hours, and then rinse it with running tap water. Clean with soap and water then let it dry.     You should try to keep your machine clean in order to work well. Here are some tips to clean PAP supplies / accessories:    Clean the humidification chamber (aka water tank) as well as your mask and tubing at least once a week with soap and water.   Alternatively, you can fill a sink or basin with warm water and add a little mild detergent, like Ivory dish soap. Gently wipe your supplies with the soapy water to free all the oils and dirt that may have collected. Once that's done, rinse these items with clean water until the soap is gone and let them air dry. You can hang your tubing over the curtain nahomy in your bathroom so that it dries.  The mask insert (part of the mask that has contact with your skin) needs to be cleaned with soap and water daily. Another option is to wipe them down with CPAP wipes or baby wipes.    You should replace your mask and tubing frequently in order to prevent bacteria buildup, machine damage, and mask seal issues. The older the mask and hose, the high likelihood that there is bacteria buildup in it especially if they are not cleaned regularly. Dirty filters damage machines because build-up of dust and contaminants can cause machine to over-heat, and in time, damage the motor of machine. Cushions lose their seal over time as most masks are made of plastic and silicone while headgear is made of neoprene. These materials will break down with age and frequent use. Here is the recommended replacement schedule for PAP supplies / accessories:    Twice a month- disposable filters and cushions for nasal mask or nasal pillows.  Once a month- cushion for full face mask  Every 3 months- mask with headgear and PAP tubing (standard or heated hose)  Every 6 months- reusable filter, water chamber, and chin strap     Other useful  information:    Some people do not put water in the tank while other people prefers to put water in the tank to prevent mouth dryness. Try to experiment to determine which is more comfortable for you.   In general, new machines have 2 years warranty on parts while health insurance allows you to have a new machine once every 5 years.     Common issues with PAP machine:    Mask gets dislodged when turning to the side: Consider getting a CPAP pillow or switching to a mask with hose on top.     Dry mouth:  Your machine has built-in humidifier that heats up the air to prevent dry mouth. It can be adjusted to your comfort. You can try that first and increase setting one level one night at a time to check which setting is comfortable and effective in lessening dry mouth. In some patients with heated hose, adjusting tube temperature to make air warmer can improve dry mouth. If dry mouth persists despite adjusting humidity or tube temperature setting, may apply OTC Biotene gel over the gums at bedtime.  If Biotene gel is not effective, consider trying XEROSTOM gel from [employer].  Also, eliminate or reduce dose of medications that can cause dry mouth if possible. Lastly, may try getting a separate room humidifier machine.    Airleaks: Please call DME as they may need to adjust your mask or refit you with a different kind or different size of mask. In addition, you can ask DME for tips on getting a good mask seal and mask fit.     Difficulty tolerating the mask: Contact your DME to try a different kind of mask and/or call office to get a referral to Sleep Psychologist for CPAP desensitization. CPAP desensitization technique is a set of strategies that helps patient cope with claustrophobia and anxiety related to wearing mask. Alternatively, we can do a daytime mini-sleep study called PAP-nap trial wherein you will try on different kinds of mask and the sleep technician will try different pressure settings on CPAP and BPAP  "machines to see which specific pressure is tolerable and comfortable for you.     Water droplets or moisture within the hose and/or mask: This is called rain-out and it is caused by condensation of too much heated humidity on the cooler walls of the hose. If you have rain-out, turn down humidity settings or get a heated hose. If you already have a heated hose, turn up the \"tube temperature\" of the heated hose. Alternatively, if you don't want to get a heated hose or warmer air, may wrap the CPAP hose with stockings to keep it somewhat warm. Also, you need to place the machine on the floor and lower the hose so that water won't travel upward towards your mask.     You can also go to the following EDUCATION WEBSITES for further information:   American Academy of Sleep Medicine http://sleepeducation.org  National Sleep Foundation: https://sleepfoundation.org  American Sleep Apnea Association: https://www.sleepapnea.org (for patients with sleep apnea)  Narcolepsy Network: https://www.narcolepsynetwork.org (for patients with narcolepsy)  [employer]: https://www.gShift LabsupSOHMcolepsy.org (for patients with narcolepsy)  Hypersomnia Foundation: https://www.hypersomniafoundation.org (for patients with idiopathic hypersomnia)  RLS foundation: https://www.rls.org (for patients with restless leg syndrome)    IMPORTANT INFORMATION     Call 911 for medical emergencies.  Our offices are generally open from Monday-Friday, 8 am - 5 pm.   There are no supporting services by either the sleep doctors or their staff on weekends and Holidays, or after 5 PM on weekdays.   If you need to get in touch with me, you may either call my office number or you can use [employer].  If a referral for a test, for CPAP, or for another specialist was made, and you have not heard about scheduling this within a week, please call scheduling at [phone] ([phone]).  If you are unable to make your appointment for clinic or an overnight study, kindly call " the office or sleep testing center at least 48 hours in advance to cancel and reschedule.  If you are on CPAP, please bring your device's card and/or the device to each clinic appointment.   In case of problems with PAP machine or mask interface, please contact your DME (Durable Medical Equipment) company first. DME is the company who provides you the machine and/or PAP supplies.       PRESCRIPTIONS     We require 7 days advanced notice for prescription refills. If we do not receive the request in this time, we cannot guarantee that your medication will be refilled in time.    IMPORTANT PHONE NUMBERS     Sleep Medicine Clinic Fax: [phone]  Appointments (for Pediatric Sleep Clinic): [phone] - option 1  Appointments (for Adult Sleep Clinic): [phone] - option 2  Appointments (For Sleep Studies): [phone] - option 3  Behavioral Sleep Medicine: [phone]  Sleep Surgery: [phone]  Nutrition Service: [phone]  Weight management clinics with endocrinology: [phone]  Bariatric Services: [phone] (includes weight loss medications and weight loss surgery)  [facility]: [phone] (offers holistic approaches to weight management)  ENT (Otolaryngology): [phone]  Headache Clinic (Neurology): [phone]  Neurology: [phone]  Psychiatry: [phone]  Pulmonary Function Testing (PFT) Center: [phone]  Pulmonary Medicine: [phone]  [employer] (DME): [phone]      OUR SLEEP TESTING LOCATIONS     Our team will contact you to schedule your sleep study, however, you can contact us as follow:  Main Phone Line (scheduling only): [phone]), option 3  Adult and Pediatric Locations  [facility] (6 years and older): [facility] - 4th floor ([address] [place] [address]) After hours line: [phone]  [facility] (Main campus: All ages): [facility], 6th floor.  "After hours line: [phone]   [place] (5 years and older; younger considered on case-by-case basis): [address]; [address], [address]. Scheduling  After hours line: [phone]   [place] (6 years and older): [address]; [address]; [address]   [place] (6 years and older): [address] [facility], [address]  After hours line: [phone]   [place] (13 years and older) in [place]: [address], 2nd floor  After hours line: [phone]   [place] (13 year and older): [address]  After hours line: [phone]     Adult Only Locations:   [place] (18 years and older): [address] [facility], 2nd floor   [place] (18 years and older): [address] [facility]; 4th floor  After hours line: [phone]  [facility] (18 years and older) at [place]: [id number] [place] [address]  After hours line: [phone]     CONTACTING YOUR SLEEP MEDICINE PROVIDER AND SLEEP TEAM      For issues with your machine or mask interface, please call your DME provider first. DME stands for durable medical company. DME is the company who provides you the machine and/or PAP supplies / accessories.   To schedule, cancel, or reschedule SLEEP STUDY APPOINTMENTS, please call the Main Phone Line at [phone] - option 3.   To schedule, cancel, or reschedule CLINIC APPOINTMENTS, you can do it in \"Spreetaleshart\", call [phone] to speak with my  (Nesha Nath), or call the Main Phone Line at [phone] - option 2  For CLINICAL QUESTIONS or MEDICATION REFILLS, please call direct line for Adult Sleep Nurses at [phone].   Lastly, you can also send a message directly to your provider through \"My Chart\", which is the email service through your  Records Account: [email or web address]       Here at [facility], we wish you a restful sleep!   "

## 2025-03-03 NOTE — PROGRESS NOTES
[facility]  [facility]  [address]  [facility] [address]  [phone]     [facility]  Follow Up Visit Note      Subjective   Patient ID: Natalie Moore is a 69 y.o. female with past medical history significant for Overweight, Hypertension, Attention deficit disorder, Depression, Schizophrenia, and OBSTRUCTIVE SLEEP APNEA .      3/10/2025: UPDATED: After adjusting the pressure setting, the patient returns to the clinic and presents to review her pap compliance. Her over 4 hours pap compliance rate improved by 63 %, and her ESS  decreased to 10. LETICIA: 13  today. She reports more energy after using pap, and her Restless Legs Syndrome is improving. However, she is still taking 4 hours of naps during the afternoon; I instructed her to use it during the nap to decrease the Excessive Daytime Sleepiness, and the patient verbalized understanding of it.     1/13/25: The patient had a virtual visit with me to review her initial pap compliance. Her over 4 hours pap compliance rate was  60   %, and Her ESS  is 12  today. She reports having no issue with her pap but sometimes still snoring. She also takes 4 hour nap without using a pap. To get a refreshed sleep, she verbalized understanding of the need to use pap during the nap to refresh her sleep, decrease the nap time, and increase the usage to meet insurance company requirements.       11/4/2024: The patient is here alone today and was referred by PCP Karri Horn MD, for comprehensive sleep medicine evaluation due to sleep apnea recently diagnosed and fatigue. She reports that if she is not actively doing something, she'll get so tired she'll fall asleep but not sleep behind the wheel. She reports that she snores heavily at night and doesn't feel refreshed in the mornings. The patient came to the clinic to review her sleep study and treat her sleep  apnea. [employer] liaison, Mone, provided her with a P30 I medium mask to try in the clinic, and she reports being comfortable with it. The desensitization strategy, 30-day free mask return policy, and insurance requirements are all discussed with the patient. The patient verbalized understanding it. Her ESS is 13, and her LETICIA is 22  today.     HPI  Patient had been having these symptoms for the past 50 years. Patient had Home Sleep Study in 2024 which showed HIMA but no CPAP started yet.        SLEEP STUDY HISTORY: (personally reviewed raw data such as interpretation report, data sheet, hypnogram, and titration table if available and applicable)  7/8/24: Home Sleep Study: BMI: 30.58, AHI 3%: 19.5/hr, Supine AHI 3%: 25.3/hr, AHI 4%: 15.3/hr, Supine AHI 4%: 19.4/hr, <88%: 7.8 minutes, Roddy: 68.3%, consider 5-16 CWP     SLEEP-WAKE SCHEDULE (after cpap)  Bedtime: 8 PM/1AM on weekdays, same on weekends  Subjective sleep latency: decrease to 1/2 -1 hour  Problems falling asleep: better  Number of awakenings: decreased to 0 times after using cpap  Problems staying asleep: No  Final wake time: 8 AM on weekdays, same on weekends  Shift work: No  Naps: Yes, 4 hours  Feels rested after a nap: No   Average sleep duration (excluding naps): 6 hours     SLEEP ENVIRONMENT  Sleep location: bed  Sleep status: sleeps alone  Room is dark:  Yes  Room is quiet: Yes  Room is cool: Yes  Bed comfort: good     SLEEP HABITS:   Activities before bedtime: watch TV  Activities in bed: no  Preferred sleep position: side     SLEEP ROS: (after cpap)  Night symptoms: Improved for snoring, witnessed apnea, and mouth breathing  Morning symptoms: Improved for unrefreshing sleep  Daytime symptoms Improved for excessive daytime sleepiness, fatigue, trouble remembering things in daytime, and trouble staying focused in daytime  Hypersomnia / narcolepsy symptoms: Patient denies symptoms of a hypersomnolence disorder such as sleep paralysis,  sleep-related hallucinations, and cataplexy.   RLS symptoms: Patient admits having urge to move legs that occurs at rest (sitting, getting into bed, or lying n bed), worse in the evening, and relieved temporarily with movement.   Frequency of RLS symptoms: daily  RLS symptoms affect sleep onset: Yes   RLS symptoms wakes patient up from sleep: Yes   Current or past treatment for RLS: No  Movements in sleep: Positive for frequent leg kicks / jerks while asleep  Parasomnia symptoms: Patient denies symptoms of parasomnia such as sleepwalking, sleeptalking, sleep-eating, acting out dreams, and nightmares.      WEIGHT: gained 10 lbs in 6 months      REVIEW OF SYSTEMS: All other systems have been reviewed and are negative.     PERTINENT SOCIAL HISTORY:  Occupation: No  Smoking: former smoker  ETOH: No   Marijuana: No   Caffeine: Yes  Sleep aids: No   Claustrophobia: No      PERTINENT PAST SURGICAL HISTORY:  non-contributory     PERTINENT FAMILY HISTORY:  Patient denies family history of any sleep disorder.     Active Problems, Allergy List, Medication List, and PMH/PSH/FH/Social Hx have been reviewed and reconciled in chart. No significant changes unless documented in the pertinent chart section. Updates made when necessary.       REVIEW OF SYSTEMS  All other systems have been reviewed and are negative.      ALLERGIES  Allergies   Allergen Reactions    Amoxicillin Diarrhea    Tetracycline GI Upset       MEDICATIONS  Current Outpatient Medications   Medication Sig Dispense Refill    atorvastatin (Lipitor) 40 mg tablet TAKE 1 TABLET BY MOUTH ONCE DAILY 30 tablet 10    benztropine (Cogentin) 1 mg tablet TAKE 1 TABLET BY MOUTH TWICE DAILY. WITH 0.5MG TABLET = TOTAL 1.5 MG TWICE A DAY      celecoxib (CeleBREX) 200 mg capsule TAKE 1 CAPSULE BY MOUTH ONCE DAILY 30 capsule 10    haloperidol decanoate (Haldol Decanoate) 100 mg/mL injection INJECT 2 ML INTRAMUSCULARLY EVERY (14) DAYS      mupirocin (Bactroban) 2 % cream Apply small  amount to affected area 3 times daily 30 g 0     No current facility-administered medications for this visit.     Objective     Vitals:    03/10/25 1438   BP: 115/74   Pulse: 83   Temp: 36.7 °C (98.1 °F)   SpO2: 97%        Physical Exam  Constitutional: Awake, not in distress  Lungs: Clear to auscultation bilateral, no rales  Heart: Regular rate and rhythm, no murmurs  Skin: Warm, no rash  Neuro: No tremors, moves all extremities  Psych: alert and oriented to time, place, and person    PAP Adherence:  DURABLE MEDICAL EQU[employer]MENT COMPANY: [employer]  Machine: THERAPY: RESMED AIRSENSE 11 PRESSURE SETTINGS: 6 cm H2O - 16 cm H2O  Mask: Simplus P 30i   Issues with therapy: ISSUES WITH THERAPY: denies  Benefits with PAP: PERCEIVED BENEFITS OF PAP: refreshing sleep reduced daytime sleepiness decreased or no snoring better sleep quality    A PAP adherence download was obtained and data was reviewed personally today in clinic. (see scanned document in EPIC)           Assessment/Plan   Natalie Moore is a 69 y.o. female presents today in [facility] with the following problems:    # OBSTRUCTIVE SLEEP APNEA : MODERATE  -Better compliance, continue 6-16 cmH20 auto CPAP  with P30 I medium mask and renew annal supplies through [employer].  -Remind her to use pap during the nap time because she has 4 hours nap  -Sleep apnea and PAP therapy education were provided at length in the clinic today. Natalie Moore verbalized understanding.  -Emphasized diet, exercise, and weight loss in the clinic, as were non-supine sleep, avoiding alcohol in the late evening, and driving or operating heavy machinery when sleepy.  -Natalie Oscarverbalizes understanding of the above instructions and risks.     # CHRONIC SLEEP ONSET/ SLEEP MAINTENANCE INSOMNIA:  -report better  -Sleep hygiene discuss in the clinic.     # SCHIZOPHRENIA:   -Natalie Mooreis taking HALDOL injection.  -Denies  HI/SI     # HYPERTENSION/A-Fib:   -The BP is 115/74 in the clinic. HR is regular today.  -Denies headache, palpitation, and syncope in the clinic.  -Follows with PCP/ Cardiology     # OVERWEIGHT:  -with a BMI of 30.73. Natalie Moore most recent Bicarbonate was 27            Bicarbonate   Date Value Ref Range Status   05/17/2024 27 21 - 32 mmol/L Final   -Encourage to have regular exercise to manage weight well.     # XEROSTOMIA:  -Increased the humility to 5  -Instruct Natalie Mooreto purchase the Biotene gel to ease the dry mouth symptom.     # RESTLESS LEG SYNDROME:   -feel better after using cpap.     RTC 3 months      All of patient's questions were answered. She verbalizes understanding and agreement with my assessment and plan.    Please excuse any errors in grammar or translation related to dictation.

## 2025-03-10 ENCOUNTER — OFFICE VISIT (OUTPATIENT)
Dept: SLEEP MEDICINE | Facility: HOSPITAL | Age: 70
End: 2025-03-10
Payer: COMMERCIAL

## 2025-03-10 VITALS
DIASTOLIC BLOOD PRESSURE: 74 MMHG | TEMPERATURE: 98.1 F | WEIGHT: 179 LBS | SYSTOLIC BLOOD PRESSURE: 115 MMHG | HEART RATE: 83 BPM | OXYGEN SATURATION: 97 % | BODY MASS INDEX: 30.73 KG/M2

## 2025-03-10 DIAGNOSIS — F20.9 SCHIZOPHRENIA, UNSPECIFIED TYPE: Chronic | ICD-10-CM

## 2025-03-10 DIAGNOSIS — F90.9 ATTENTION DEFICIT HYPERACTIVITY DISORDER (ADHD), UNSPECIFIED ADHD TYPE: ICD-10-CM

## 2025-03-10 DIAGNOSIS — I10 BENIGN ESSENTIAL HYPERTENSION: ICD-10-CM

## 2025-03-10 DIAGNOSIS — G47.33 OBSTRUCTIVE SLEEP APNEA (ADULT) (PEDIATRIC): Primary | ICD-10-CM

## 2025-03-10 DIAGNOSIS — G25.81 RLS (RESTLESS LEGS SYNDROME): ICD-10-CM

## 2025-03-10 DIAGNOSIS — F32.A DEPRESSION, UNSPECIFIED DEPRESSION TYPE: ICD-10-CM

## 2025-03-10 PROBLEM — G47.30 SLEEP DISORDER BREATHING: Status: RESOLVED | Noted: 2024-11-04 | Resolved: 2025-03-10

## 2025-03-10 PROBLEM — F17.200 TOBACCO USE DISORDER: Status: RESOLVED | Noted: 2019-02-26 | Resolved: 2025-03-10

## 2025-03-10 PROCEDURE — 1126F AMNT PAIN NOTED NONE PRSNT: CPT

## 2025-03-10 PROCEDURE — 99213 OFFICE O/P EST LOW 20 MIN: CPT

## 2025-03-10 PROCEDURE — 1159F MED LIST DOCD IN RCRD: CPT

## 2025-03-10 PROCEDURE — 3078F DIAST BP <80 MM HG: CPT

## 2025-03-10 PROCEDURE — 3074F SYST BP LT 130 MM HG: CPT

## 2025-03-10 PROCEDURE — 1036F TOBACCO NON-USER: CPT

## 2025-03-10 ASSESSMENT — SLEEP AND FATIGUE QUESTIONNAIRES
HOW LIKELY ARE YOU TO NOD OFF OR FALL ASLEEP WHILE WATCHING TV: MODERATE CHANCE OF DOZING
HOW LIKELY ARE YOU TO NOD OFF OR FALL ASLEEP WHILE LYING DOWN TO REST IN THE AFTERNOON WHEN CIRCUMSTANCES PERMIT: MODERATE CHANCE OF DOZING
HOW LIKELY ARE YOU TO NOD OFF OR FALL ASLEEP WHEN YOU ARE A PASSENGER IN A CAR FOR AN HOUR WITHOUT A BREAK: MODERATE CHANCE OF DOZING
WORRIED_DISTRESSED_DUE_TO_SLEEP: SOMEWHAT
HOW LIKELY ARE YOU TO NOD OFF OR FALL ASLEEP WHILE SITTING AND READING: SLIGHT CHANCE OF DOZING
DIFFICULTY_STAYING_ASLEEP: SEVERE
HOW LIKELY ARE YOU TO NOD OFF OR FALL ASLEEP WHILE SITTING AND TALKING TO SOMEONE: SLIGHT CHANCE OF DOZING
DIFFICULTY_FALLING_ASLEEP: MILD
SLEEP_PROBLEM_NOTICEABLE_TO_OTHERS: A LITTLE
ESS-CHAD TOTAL SCORE: 10
HOW LIKELY ARE YOU TO NOD OFF OR FALL ASLEEP WHILE SITTING QUIETLY AFTER LUNCH WITHOUT ALCOHOL: MODERATE CHANCE OF DOZING
SLEEP_PROBLEM_INTERFERES_DAILY_ACTIVITIES: A LITTLE
WAKING_TOO_EARLY: MODERATE
SITING INACTIVE IN A PUBLIC PLACE LIKE A CLASS ROOM OR A MOVIE THEATER: WOULD NEVER DOZE
HOW LIKELY ARE YOU TO NOD OFF OR FALL ASLEEP IN A CAR, WHILE STOPPED FOR A FEW MINUTES IN TRAFFIC: WOULD NEVER DOZE
SATISFACTION_WITH_CURRENT_SLEEP_PATTERN: DISSATISFIED

## 2025-03-10 ASSESSMENT — PAIN SCALES - GENERAL: PAINLEVEL_OUTOF10: 0-NO PAIN

## 2025-03-10 NOTE — PATIENT INSTRUCTIONS
[facility] Sleep Medicine  [facility]  [address]  [facility] [id number] [phone]       Thank you for coming to the Sleep Medicine Clinic today! Your sleep medicine provider today was: ALCIRA Flores Below is a summary of your treatment plan, patient education, other important information, and our contact numbers.    Dear Ms Natalie Moore       Your Sleep Provider Today: ALCIRA Flores  Your Primary Care Physician: Karri Horn MD   Your Referring Provider: Iona Mcclellan APRN-CNP    Diagnosis: OBSTRUCTIVE SLEEP APNEA       Thank you for visiting  Sleep Medicine Clinic !     1.You are doing fine, I ordered the annual supplies for you. Feel free to contact your DME company to get new supplies.    2. Please do not drive when you are sleepy and continue practicing the sleep hygiene as discussed in clinic.    3. FOR QUESTIONS AND CONCERNS:   a) : In case of problems with machine or mask interface, please contact your DME company first. DME is the company who provides you the machine and/or PAP supplies / accessories. If [employer] is your DME, you can reach them at [phone].   b): Please call my office with issues or questions: [phone] ([place]); [phone] ([facility]); [phone] ([place])    If you have a CPAP or BiPAP machine at home, please bring the unit and all accessories including the power cord to your appointments unless I tell you otherwise. Please have knowledge of the DME company you worked with to receive your PAP device. If you have copies of any previous sleep testing completed outside of , please bring with you to clinic as well. This information will make our visits more productive.     If you are new to CPAP or BiPAP, please note the minimum usage insurance requires to continuing coverage for the equipment as noted by your DME company. Please discuss equipment issues (PAP  "unit, mask fit, humidification, etc.) with your DME company first.       In the event that you are running more than 10 minutes late to your appointment, I will kindly ask you to reschedule. Thanks.      TREATMENT PLAN     - Continue current machine settings. Continue using machine every night all night.   - Renew PAP supplies. Order placed and sent to [employer].  - Please read the \"Patient Education\" section below for more detailed information. Try implementing tips, reminders, strategies, and supportive management.   - If not yet done, please sign up for [employer] to make a future schedule, send prescription requests, or send messages.    Follow-up Appointment:   Follow-up in 3 months    PATIENT EDUCATION     OBSTRUCTIVE SLEEP APNEA (HIMA) is a sleep disorder where your upper airway muscles relax during sleep and the airway intermittently and repetitively narrows and collapses leading to partially blocked airway (hypopnea) or completely blocked airway (apnea) which, in turn, can disrupt breathing in sleep, lower oxygen levels while you sleep and cause night time wakings. Because both apnea and hypopnea may cause higher carbon dioxide or low oxygen levels, untreated HIMA can lead to heart arrhythmia, elevation of blood pressure, and make it harder for the body to consolidate memory and facilitate metabolism (leading to higher blood sugars at night). Frequent partial arousals occur during sleep resulting in sleep deprivation and daytime sleepiness. HIMA is associated with an increased risk of cardiovascular disease, stroke, hypertension, and insulin resistance. Moreover, untreated HIMA with excessive daytime sleepiness can increase the risk of motor vehicular accidents.    Below are conservative strategies for HIMA regardless of HIMA severity are:   Positional therapy - Avoid sleeping on your back.   Healthy diet and regular exercise to optimize weight is highly encouraged.   Avoid alcohol late in the evening " and sedative-hypnotics as these substances can make sleep apnea worse.   Improve breathing through the nose with intranasal steroid spray, saline rinse, or antihistamines    Safety: Avoid driving vehicle and operating heavy equipment while sleepy. Drowsy driving may lead to life-threatening motor vehicle accidents. A person driving while sleepy is 5 times more likely to have an accident. If you feel sleepy, pull over and take a short power nap (sleep for less than 30 minutes). Otherwise, ask somebody to drive you.    Treatment options for sleep apnea include weight management, positional therapy, Positive Airway Therapy (PAP) therapy, oral appliance therapy, hypoglossal nerve stimulator (Inspire) and select airway surgeries.    Starting Positive Airway Pressure (PAP): You were ordered a device to wear when you sleep called PAP (Positive Airway Pressure) to treat your sleep apnea. The order will be submitted to a durable medical equipment (DME) company who will arrange setting you up with the device. They will provide all the necessary equipment and discuss use and maintenance of the device with you as well as mask fitting and process of replacing / renewing PAP supplies or accessories. Once you get the machine, please start using it immediately. You may not be successful right away and that is okay. [place] be certain that you keep trying nightly and reach out to DME if you are struggling or having issues with machine usage.     *Please follow-up with me in 1-2 months of starting CPAP to see how well it is working for you and to do some troubleshooting if needed. Also, please bring all PAP equipment with you to follow up appointments unless told otherwise.     Important things to keep in mind as you start PAP:  Insurance will monitor your usage during the first 90 days. You should use your PAP - all night, every night, and including all naps (especially if naps are more than 30 minutes) for your health. The bare  minimum is to use your PAP device while sleeping for at least 4 hours per day at least 5-6 days per week.. Otherwise, your PAP device will be reclaimed by your DME company at 90 days.  There are many masks to choose from to wear with your PAP machine. If you are not comfortable with the first mask issued to you, call your DME company and ask for another option to try. You typically have a 30-day mask guarantee from the day you received your machine.   Discuss with your provider if you are having issues breathing with the machine or if the temperature or humidity feel uncomfortable.  Expect to have an adjustment period when you start your device. It helps to continuing wearing the machine every day for a period of time until you get more used to it. You can practice with wearing the mask alone if you need, then add in the PAP air pressure a few days later.   Reach out for help if you are struggling! The sleep medicine department can be reached at [phone]  We encourage you to download data monitoring apps to your phone. For [employer] 10/11 - MyAir ligia. For [employer] - DreamMapper. Both apps are available in the [employer] store for free and are a great tool to monitor your progress with your PAP device night to night.    Tips for success with PAP machine usage:  Comfortable and well-fitting mask  Appropriate pressure on the machine  Using humidification  Support from bed partner and clinical team      Maintaining your CPAP/BPAP device:    The humidification chamber (aka water tank or water chamber) needs to be filled with distilled water to prevent buildup of white deposits in the future. If you cannot find distilled water, you can use tap water but expect to have white deposits buildup seen after prolonged use with tap water. If you start seeing white deposits on the water chamber, you can clean it by filling it with equal parts of distilled white vinegar and water. Let the vinegar-water mixture  sit for 2 hours, and then rinse it with running tap water. Clean with soap and water then let it dry.     You should try to keep your machine clean in order to work well. Here are some tips to clean PAP supplies / accessories:    Clean the humidification chamber (aka water tank) as well as your mask and tubing at least once a week with soap and water.   Alternatively, you can fill a sink or basin with warm water and add a little mild detergent, like [place] dish soap. Gently wipe your supplies with the soapy water to free all the oils and dirt that may have collected. Once that's done, rinse these items with clean water until the soap is gone and let them air dry. You can hang your tubing over the curtain nahomy in your bathroom so that it dries.  The mask insert (part of the mask that has contact with your skin) needs to be cleaned with soap and water daily. Another option is to wipe them down with CPAP wipes or baby wipes.    You should replace your mask and tubing frequently in order to prevent bacteria buildup, machine damage, and mask seal issues. The older the mask and hose, the high likelihood that there is bacteria buildup in it especially if they are not cleaned regularly. Dirty filters damage machines because build-up of dust and contaminants can cause machine to over-heat, and in time, damage the motor of machine. Cushions lose their seal over time as most masks are made of plastic and silicone while headgear is made of neoprene. These materials will break down with age and frequent use. Here is the recommended replacement schedule for PAP supplies / accessories:    Twice a month- disposable filters and cushions for nasal mask or nasal pillows.  Once a month- cushion for full face mask  Every 3 months- mask with headgear and PAP tubing (standard or heated hose)  Every 6 months- reusable filter, water chamber, and chin strap     Other useful information:    Some people do not put water in the tank while other  people prefers to put water in the tank to prevent mouth dryness. Try to experiment to determine which is more comfortable for you.   In general, new machines have 2 years warranty on parts while health insurance allows you to have a new machine once every 5 years.     Common issues with PAP machine:    Mask gets dislodged when turning to the side: Consider getting a CPAP pillow or switching to a mask with hose on top.     Dry mouth:  Your machine has built-in humidifier that heats up the air to prevent dry mouth. It can be adjusted to your comfort. You can try that first and increase setting one level one night at a time to check which setting is comfortable and effective in lessening dry mouth. In some patients with heated hose, adjusting tube temperature to make air warmer can improve dry mouth. If dry mouth persists despite adjusting humidity or tube temperature setting, may apply OTC Biotene gel over the gums at bedtime.  If Biotene gel is not effective, consider trying XEROSTOM gel from [employer].  Also, eliminate or reduce dose of medications that can cause dry mouth if possible. Lastly, may try getting a separate room humidifier machine.    Airleaks: Please call DME as they may need to adjust your mask or refit you with a different kind or different size of mask. In addition, you can ask DME for tips on getting a good mask seal and mask fit.     Difficulty tolerating the mask: Contact your DME to try a different kind of mask and/or call office to get a referral to Sleep Psychologist for CPAP desensitization. CPAP desensitization technique is a set of strategies that helps patient cope with claustrophobia and anxiety related to wearing mask. Alternatively, we can do a daytime mini-sleep study called PAP-nap trial wherein you will try on different kinds of mask and the sleep technician will try different pressure settings on CPAP and BPAP machines to see which specific pressure is tolerable and comfortable  "for you.     Water droplets or moisture within the hose and/or mask: This is called rain-out and it is caused by condensation of too much heated humidity on the cooler walls of the hose. If you have rain-out, turn down humidity settings or get a heated hose. If you already have a heated hose, turn up the \"tube temperature\" of the heated hose. Alternatively, if you don't want to get a heated hose or warmer air, may wrap the CPAP hose with stockings to keep it somewhat warm. Also, you need to place the machine on the floor and lower the hose so that water won't travel upward towards your mask.     PAP desensitization techniques: If you have concerns about something being on your face at night, you can start by getting used to it before trying to sleep with it as follows:      Sit in a comfortable chair or bed. Connect the mask and hose to the CPAP/BPAP machine. Hold the mask on your face (without straps on) and turn on the machine. Practice breathing with the mask on while awake sitting and watching television, reading, or performing a sedentary activity during the day for 5-10 minutes and then take it off.  If tolerated, try again and gradually build up to longer periods of time. If not tolerated, try and try again until it is more comfortable as you become more desensitized. If you are able to use it for at least 20-30 minutes, move unto the next step.     Sit in a comfortable chair or bed. Connect the mask and hose to the CPAP/BPAP machine. Strap the mask on your head and turn on the machine. Practice breathing with the mask and headgear on while awake sitting and watching television, reading, or performing a sedentary activity. Start with 5-10 minutes and gradually increasing time until you can wear it comfortably for at least 20-30 minutes, then move to the next step.    Take a shorter daytime nap with machine turned on while you are in a reclined position in bed, sofa, or recliner. Start with 5-10 minute nap and " gradually increase up to 30 minutes. It is not important whether you fall asleep or not. The goal is to rest comfortably with PAP machine on.     Reintroduce PAP machine into nighttime sleep. You can begin using it a portion of the night and gradually increase up to entire night.     Proceed from one step to the next only when you are completely comfortable. If you feel any anxiety or discomfort, return to the previous step, then proceed again when comfortable.    Expect to “work” with your CPAP/BIPAP unit. It is important to try to relax when beginning CPAP/BIPAP therapy. Inhalation and exhalation should occur through the nose only. If you are unable to consistently breathe this way, do not panic or lose hope. There are other types of masks which allow you to breathe through your nose and/or your mouth. Also, in some patients, using intranasal steroid spray (e.g. Flonase or Nasocort or Fluticasone) 1 hour before bedtime and/or before putting on CPAP mask can help tolerate breathing through the mask.    Don't give up after a few attempts--some patients adjust quickly, while some patients need 3-4 weeks (or sometimes even longer) to be accustomed to CPAP therapy.  Contact your sleep medicine specialist if you have a significant change in weight since this may affect your pressure.    You can also go to the following EDUCATION WEBSITES for further information:   American Academy of Sleep Medicine http://sleepeducation.org  National Sleep Foundation: https://sleepfoundation.org  American Sleep Apnea Association: https://www.sleepapnea.org (for patients with sleep apnea)  Narcolepsy Network: https://www.narcolepsynetwork.org (for patients with narcolepsy)  [employer]: https://www.wakeupnarcolepsy.org (for patients with narcolepsy)  Hypersomnia Foundation: https://www.hypersomniafoundation.org (for patients with idiopathic hypersomnia)  RLS foundation: https://www.rls.org (for patients with restless leg  syndrome)    IMPORTANT INFORMATION     Call 911 for medical emergencies.  Our offices are generally open from Monday-Friday, 8 am - 5 pm.   There are no supporting services by either the sleep doctors or their staff on weekends and Holidays, or after 5 PM on weekdays.   If you need to get in touch with me, you may either call my office number or you can use [employer].  If a referral for a test, for CPAP, or for another specialist was made, and you have not heard about scheduling this within a week, please call scheduling at [phone]).  If you are unable to make your appointment for clinic or an overnight study, kindly call the office or sleep testing center at least 48 hours in advance to cancel and reschedule.  If you are on CPAP, please bring your device's card and/or the device to each clinic appointment.   In case of problems with PAP machine or mask interface, please contact your DME (Durable Medical Equipment) company first. DME is the company who provides you the machine and/or PAP supplies.       PRESCRIPTIONS     We require 7 days advanced notice for prescription refills. If we do not receive the request in this time, we cannot guarantee that your medication will be refilled in time.    IMPORTANT PHONE NUMBERS     Sleep Medicine Clinic Fax: [phone]  Appointments (for Pediatric Sleep Clinic): [phone] - option 1  Appointments (for Adult Sleep Clinic): [phone] - option 2  Appointments (For Sleep Studies): [phone] - option 3  Behavioral Sleep Medicine: [phone]  Sleep Surgery: [phone]  Nutrition Service: [phone]  Weight management clinics with endocrinology: [phone]  Bariatric Services: [phone] (includes weight loss medications and weight loss surgery)  [facility]: [phone] (offers holistic approaches to weight management)  ENT (Otolaryngology): [phone]  Headache Clinic (Neurology): [phone]  Neurology:  [phone]  Psychiatry: [phone]  Pulmonary Function Testing (PFT) Center: [phone]  Pulmonary Medicine: [phone]  Medical Service [employer] ([employer]): [phone]      OUR SLEEP TESTING LOCATIONS     Our team will contact you to schedule your sleep study, however, you can contact us as follow:  Main Phone Line (scheduling only): [phone] ([phone]), option 3  Adult and Pediatric Locations  [facility] (6 years and older): [facility] - 4th floor ([address] [facility]) After hours line: [phone]  [facility] (Main campus: All ages): [facility], 6th floor. After hours line: [phone]   [place] (5 years and older; younger considered on case-by-case basis): [address]; [address], [address]. Scheduling  After hours line: [phone]   [place] (6 years and older): [address]; [address]; [address]   [place] (6 years and older): [address] [facility], [address]  After hours line: [phone]   [place] (13 years and older) in [place]: [address], 2nd floor  After hours line: [phone]  [facility] (13 year and older): [address]  After hours line: [phone]     Adult Only Locations:   [place] (18 years and older): [address] [place] [address], 2nd floor   [place] (18 years and older): [address] [facility]; 4th floor  After hours line: [phone]  [facility] (18 years and older) at [place]: [address] [facility]  After hours line: [phone]     CONTACTING YOUR SLEEP MEDICINE PROVIDER AND SLEEP TEAM      For issues with your machine or mask interface, please call your DME provider first. DME stands for durable medical company. DME is the company who provides you the machine and/or PAP supplies / accessories.   To schedule, cancel, or reschedule SLEEP STUDY APPOINTMENTS, please call the Main Phone Line at [phone] - option 3.   To schedule, cancel,  "or reschedule CLINIC APPOINTMENTS, you can do it in \"MyChart\", call [phone] to speak with my  (Nesha Nath), or call the Main Phone Line at [phone] - option 2  For CLINICAL QUESTIONS or MEDICATION REFILLS, please call direct line for Adult Sleep Nurses at [phone].   Lastly, you can also send a message directly to your provider through \"My Chart\", which is the email service through your  Records Account: [email or web address]       Here at [facility], we wish you a restful sleep!   "

## 2025-03-17 ENCOUNTER — APPOINTMENT (OUTPATIENT)
Dept: SLEEP MEDICINE | Facility: HOSPITAL | Age: 70
End: 2025-03-17
Payer: COMMERCIAL

## 2025-04-08 DIAGNOSIS — M62.89 MUSCLE TIGHTNESS: Primary | ICD-10-CM

## 2025-04-08 RX ORDER — CYCLOBENZAPRINE HCL 10 MG
10 TABLET ORAL 3 TIMES DAILY
Qty: 30 TABLET | Refills: 0 | Status: SHIPPED | OUTPATIENT
Start: 2025-04-08 | End: 2025-04-18

## 2025-05-01 DIAGNOSIS — M62.89 MUSCLE TIGHTNESS: ICD-10-CM

## 2025-05-02 RX ORDER — CYCLOBENZAPRINE HCL 10 MG
TABLET ORAL
Qty: 30 TABLET | Refills: 11 | Status: SHIPPED | OUTPATIENT
Start: 2025-05-02

## 2025-05-20 ENCOUNTER — APPOINTMENT (OUTPATIENT)
Dept: PRIMARY CARE | Facility: CLINIC | Age: 70
End: 2025-05-20
Payer: COMMERCIAL

## 2025-05-20 VITALS
TEMPERATURE: 96.6 F | OXYGEN SATURATION: 96 % | HEART RATE: 97 BPM | WEIGHT: 178 LBS | BODY MASS INDEX: 30.39 KG/M2 | DIASTOLIC BLOOD PRESSURE: 82 MMHG | SYSTOLIC BLOOD PRESSURE: 121 MMHG | HEIGHT: 64 IN

## 2025-05-20 DIAGNOSIS — F20.0 CHRONIC PARANOID SCHIZOPHRENIA (MULTI): ICD-10-CM

## 2025-05-20 DIAGNOSIS — G89.29 CHRONIC BILATERAL LOW BACK PAIN WITHOUT SCIATICA: ICD-10-CM

## 2025-05-20 DIAGNOSIS — M54.50 CHRONIC BILATERAL LOW BACK PAIN WITHOUT SCIATICA: ICD-10-CM

## 2025-05-20 DIAGNOSIS — G56.03 BILATERAL CARPAL TUNNEL SYNDROME: ICD-10-CM

## 2025-05-20 DIAGNOSIS — M65.30 TRIGGER FINGER, UNSPECIFIED FINGER, UNSPECIFIED LATERALITY: Primary | ICD-10-CM

## 2025-05-20 PROCEDURE — 3074F SYST BP LT 130 MM HG: CPT | Performed by: STUDENT IN AN ORGANIZED HEALTH CARE EDUCATION/TRAINING PROGRAM

## 2025-05-20 PROCEDURE — 3079F DIAST BP 80-89 MM HG: CPT | Performed by: STUDENT IN AN ORGANIZED HEALTH CARE EDUCATION/TRAINING PROGRAM

## 2025-05-20 PROCEDURE — 1036F TOBACCO NON-USER: CPT | Performed by: STUDENT IN AN ORGANIZED HEALTH CARE EDUCATION/TRAINING PROGRAM

## 2025-05-20 PROCEDURE — 3008F BODY MASS INDEX DOCD: CPT | Performed by: STUDENT IN AN ORGANIZED HEALTH CARE EDUCATION/TRAINING PROGRAM

## 2025-05-20 PROCEDURE — 99214 OFFICE O/P EST MOD 30 MIN: CPT | Performed by: STUDENT IN AN ORGANIZED HEALTH CARE EDUCATION/TRAINING PROGRAM

## 2025-05-20 PROCEDURE — G2211 COMPLEX E/M VISIT ADD ON: HCPCS | Performed by: STUDENT IN AN ORGANIZED HEALTH CARE EDUCATION/TRAINING PROGRAM

## 2025-05-20 PROCEDURE — 1125F AMNT PAIN NOTED PAIN PRSNT: CPT | Performed by: STUDENT IN AN ORGANIZED HEALTH CARE EDUCATION/TRAINING PROGRAM

## 2025-05-20 ASSESSMENT — PAIN SCALES - GENERAL: PAINLEVEL_OUTOF10: 6

## 2025-05-20 NOTE — PROGRESS NOTES
"Subjective   Patient ID: Natalie Moore is a 69 y.o. female who presents for No chief complaint on file..  History of Present Illness  The patient presents for evaluation of bronchitis, trigger finger, carpal tunnel syndrome, and back pain.    She reports an improvement in his respiratory symptoms following a course of antibiotics prescribed during an emergency room visit 2 months prior. He was diagnosed with upper respiratory infection/bronchitis at the urgent care.    She has been experiencing difficulty with his middle fingers, which occasionally become rigid and unresponsive to bending or straightening attempts. This issue has been present for approximately 2 months. He also reports numbness and tingling sensations in all his fingers. He has been managing these symptoms with Tylenol and other medications prescribed by his physician.    She has a history of carpal tunnel syndrome, which he believes has resolved. She  has been dealing with carpal tunnel syndrome for at least 30 years. She was previously advised to undergo surgery for this condition but declined due to concurrent health issues.    She experiences severe back pain, rating it as a 10 on a scale of 0 to 10, which occurs several times a week. This pain is localized to the middle of his lower back and does not radiate. He reports no balance issues or falls. He has been using Flexeril to manage his back pain, which he finds beneficial.      PMHx, FHx, Social Hx, Surg Hx personally reviewed at this appointment. No pertinent findings and/or changes from prior (if applicable).    ROS: Unless specified above, pt denies wt gain/loss f/c HA LoC CP SOB NVDC. See HPI above, and scanned sheet (if applicable). All other systems are reviewed and are without complaint.     Objective     /82   Pulse 97   Temp 35.9 °C (96.6 °F)   Ht 1.626 m (5' 4\")   Wt 80.7 kg (178 lb)   SpO2 96%   BMI 30.55 kg/m²      Physical Exam  General Appearance: Normal.  Vital " signs: Within normal limits.  HEENT: Within normal limits.  Respiratory: Clear to auscultation, no wheezing, rales or rhonchi.  Cardiovascular: Regular rate and rhythm, no murmurs, rubs, or gallops.  Back, Musculoskeletal: Positive Tinel's sign.  Skin: Warm and dry, no rash.  Neurological: Normal.  Psychiatric: Normal.      Lab Results   Component Value Date    WBC 4.3 (L) 05/17/2024    HGB 13.7 05/17/2024    HCT 40.9 05/17/2024     05/17/2024    CHOL 149 08/31/2021    TRIG 64 08/31/2021    HDL 40.3 08/31/2021    ALT 15 05/17/2024    AST 18 05/17/2024     05/17/2024    K 4.0 05/17/2024     05/17/2024    CREATININE 0.81 05/17/2024    BUN 11 05/17/2024    CO2 27 05/17/2024    TSH 1.53 05/17/2024    HGBA1C 5.4 01/24/2019     par     Current Outpatient Medications   Medication Instructions    atorvastatin (LIPITOR) 40 mg, oral, Daily    benztropine (Cogentin) 1 mg tablet TAKE 1 TABLET BY MOUTH TWICE DAILY. WITH 0.5MG TABLET = TOTAL 1.5 MG TWICE A DAY    celecoxib (CELEBREX) 200 mg, oral, Daily    cyclobenzaprine (Flexeril) 10 mg tablet TAKE 1 TABLET BY MOUTH THREE TIMES A DAY FOR 10 DAYS AS NEEDED FOR MUSCLE SPASMS    haloperidol decanoate (Haldol Decanoate) 100 mg/mL injection INJECT 2 ML INTRAMUSCULARLY EVERY (14) DAYS    mupirocin (Bactroban) 2 % cream Apply small amount to affected area 3 times daily        XR chest 2 views  Narrative: Interpreted By:  Jason Rojas,   STUDY:  XR CHEST 2 VIEWS;  5/17/2024 1:09 pm      INDICATION:  Signs/Symptoms:subacute on chronic malaise.      COMPARISON:  05/04/2005      ACCESSION NUMBER(S):  [id number]      ORDERING CLINICIAN:  KATIE DOW      FINDINGS:  PA and lateral radiographs of the chest were provided.          CARDIOMEDIASTINAL SILHOUETTE:  Cardiomediastinal silhouette is normal in size and configuration.      LUNGS:  Lungs are clear.      ABDOMEN:  No remarkable upper abdominal findings.      BONES:  No acute osseous changes.      Impression: 1.   No evidence of acute cardiopulmonary process.          MACRO:  None      Signed by: Jason Rojas 5/19/2024 6:56 PM  Dictation workstation:   [id number]           Assessment & Plan  1. Bronchitis: Resolved.  - Respiratory function has shown significant improvement following a course of antibiotics administered during an emergency room visit 2 months ago.  - No current breathing issues reported.  - Previous upper respiratory infection/bronchitis resolved with treatment from urgent care.  - No further treatment required at this time.    2. Trigger finger: Chronic.  - Difficulty bending and straightening middle fingers over the past 2 months.  - Positive Tinel's sign noted on physical examination.  - Referral to an orthopedic hand specialist for further evaluation and management.    3. Carpal tunnel syndrome: Chronic.  - History of carpal tunnel syndrome with current symptoms of numbness and tingling in hands.  - Positive Tinel's sign noted on physical examination.  - Advised to utilize carpal tunnel gloves and wrist braces for symptom management.  - Referral to an orthopedic hand specialist for further evaluation and management.    4. Back pain: Chronic.  - Reports severe lower back pain, rated 10 out of 10, occurring a few days a week and causing bedridden status.  - Currently using Flexeril for pain management, but it is not sufficiently effective.  - Pain localized to the lower back.  - Physical therapy will be arranged to address back pain.    Follow-up  - Referral to orthopedic hand specialist for trigger finger and carpal tunnel syndrome.  - Physical therapy for back pain.          Karri Horn MD       This medical note was created with the assistance of artificial intelligence (AI) for documentation purposes. The content has been reviewed and confirmed by the healthcare provider for accuracy and completeness. Patient consented to the use of audio recording and use of AI during their visit.

## 2025-05-20 NOTE — PATIENT INSTRUCTIONS
I have referred you to orthopedics for trigger finger and carpal tunnel. Please call [phone] to schedule.     I have referred you to physical therapy to help with your back pain. Please call [phone] to schedule at a convenient  location.

## 2025-05-21 ENCOUNTER — OFFICE VISIT (OUTPATIENT)
Dept: ORTHOPEDIC SURGERY | Facility: HOSPITAL | Age: 70
End: 2025-05-21
Payer: COMMERCIAL

## 2025-05-21 DIAGNOSIS — M65.332 ACQUIRED TRIGGER FINGER OF BOTH MIDDLE FINGERS: Primary | ICD-10-CM

## 2025-05-21 DIAGNOSIS — M65.331 ACQUIRED TRIGGER FINGER OF BOTH MIDDLE FINGERS: Primary | ICD-10-CM

## 2025-05-21 PROCEDURE — 99213 OFFICE O/P EST LOW 20 MIN: CPT | Performed by: STUDENT IN AN ORGANIZED HEALTH CARE EDUCATION/TRAINING PROGRAM

## 2025-05-21 PROCEDURE — 1159F MED LIST DOCD IN RCRD: CPT | Performed by: STUDENT IN AN ORGANIZED HEALTH CARE EDUCATION/TRAINING PROGRAM

## 2025-05-21 PROCEDURE — 1125F AMNT PAIN NOTED PAIN PRSNT: CPT | Performed by: STUDENT IN AN ORGANIZED HEALTH CARE EDUCATION/TRAINING PROGRAM

## 2025-05-21 ASSESSMENT — PAIN SCALES - GENERAL: PAINLEVEL_OUTOF10: 9

## 2025-05-21 ASSESSMENT — PAIN DESCRIPTION - DESCRIPTORS: DESCRIPTORS: ACHING;THROBBING;SORE

## 2025-05-21 ASSESSMENT — PAIN - FUNCTIONAL ASSESSMENT: PAIN_FUNCTIONAL_ASSESSMENT: 0-10

## 2025-05-23 ENCOUNTER — TELEPHONE (OUTPATIENT)
Dept: ORTHOPEDIC SURGERY | Facility: CLINIC | Age: 70
End: 2025-05-23

## 2025-05-23 DIAGNOSIS — M79.642 BILATERAL HAND PAIN: Primary | ICD-10-CM

## 2025-05-23 DIAGNOSIS — M79.641 BILATERAL HAND PAIN: Primary | ICD-10-CM

## 2025-05-23 NOTE — TELEPHONE ENCOUNTER
This nurse spoke with patient to inform her that the referral for OT had been submitted and that she is able to scheduled at her convience. Patient verbalized understanding of information receive.

## 2025-06-02 NOTE — PROGRESS NOTES
History of Present Illness:  Patient presents today for evaluation of side: bilateral long finger pain. Endorses catching of the digit with flexion. Denies any traumatic event or injury. The patient notes that it is worse with periods of disuse and in the evening and better when warmed up.  The patient endorses localized, focal pain when it catches / sharp in nature.  She had just been present for approximately 1 year.  She rates it a 9 out of 10.  The patient notes numbness and tingling in all fingers.  Denies any previous carpal tunnel releases.    Medical History[1]    Medication Documentation Review Audit       Reviewed by Zofia Darden LPN (Licensed Nurse) on 05/21/25 at 1309      Medication Order Taking? Sig Documenting Provider Last Dose Status   atorvastatin (Lipitor) 40 mg tablet [id number] No TAKE 1 TABLET BY MOUTH ONCE DAILY Karri Horn MD Taking Active   benztropine (Cogentin) 1 mg tablet [id number]  TAKE 1 TABLET BY MOUTH TWICE DAILY. WITH 0.5MG TABLET = TOTAL 1.5 MG TWICE A DAY Historical Provider, MD  Active   celecoxib (CeleBREX) 200 mg capsule [id number]  TAKE 1 CAPSULE BY MOUTH ONCE DAILY Karri Horn MD  Active   cyclobenzaprine (Flexeril) 10 mg tablet [id number]  TAKE 1 TABLET BY MOUTH THREE TIMES A DAY FOR 10 DAYS AS NEEDED FOR MUSCLE SPASMS Karri Horn MD  Active   haloperidol decanoate (Haldol Decanoate) 100 mg/mL injection [id number]  INJECT 2 ML INTRAMUSCULARLY EVERY (14) DAYS Historical Provider, MD  Active   mupirocin (Bactroban) 2 % cream [id number]  Apply small amount to affected area 3 times daily Santa Baig MD  Active                    RX Allergies[2]    Social History     Socioeconomic History    Marital status: Single     Spouse name: Not on file    Number of children: Not on file    Years of education: Not on file    Highest education level: Not on file   Occupational History    Not on file   Tobacco Use    Smoking status: Never    Smokeless tobacco: Never   Vaping Use     Vaping status: Never Used   Substance and Sexual Activity    Alcohol use: Never    Drug use: Never    Sexual activity: Not on file   Other Topics Concern    Not on file   Social History Narrative    Not on file     Social Drivers of Health     Financial Resource Strain: Not on file   Food Insecurity: No Food Insecurity (11/4/2024)    Hunger Vital Sign     Worried About Running Out of Food in the Last Year: Never true     Ran Out of Food in the Last Year: Never true   Transportation Needs: Not on file   Physical Activity: Not on file   Stress: Not on file   Social Connections: Not on file   Intimate Partner Violence: Not on file   Housing Stability: Not on file       Surgical History[3]       Review of Systems   GENERAL: Negative for malaise, significant weight loss, fever  MUSCULOSKELETAL: see HPI  NEURO:  Negative     Physical Examination  side: bilateral hand middle finger:  No obvious atrophy, no skin changes  Tenderness, palpable nodule along the flexor tendon sheath with passive ROM  Positive triggering with active flexion and extension   No subluxation of the extensor tendon appreciated over the MP joint.  Normal neurovascular exam distally     Imaging: None     Assessment:  Patient with side: bilateralhand walls digit trigger finger     Plan:  We reviewed the disease process with the patient. I explained the etiology of trigger finger. As the flexor tendons enter the flexor tendon sheath at the level of the A1 pulley, there is a size mismatch. Due to many years of use, the tendon has become inflamed or the sheath has become inflamed and it no longer easily glides into the tunnel.   We discussed the role for injections. The majority of patients (60-80%) will achieve symptom relief with corticosteroid injection, although sometimes it takes more than one. Patients with diabetes or symptoms for longer than 6 months have a lower chance of successful resolution of symptoms with injections. Patients who are not  satisfied with symptom relief from injections may be candidates for trigger finger release.  We discussed surgical intervention reviewing the risks (bleeding, neurologic injury, wound issues including infection, and recurrence) and benefits.  The patient elected for: patient would like to refrain from corticosteroid injections at this time.  She does not want to pursue any surgical treatment.     The patient is requesting referral to therapy.  I placed the referral to occupational hand therapy.        We discussed if her symptoms do not improve that I would recommend a corticosteroid injection versus surgical treatment.            [1]   Past Medical History:  Diagnosis Date    Personal history of other diseases of the musculoskeletal system and connective tissue     History of arthritis    Personal history of other diseases of the nervous system and sense organs     History of sleep apnea    Personal history of other endocrine, nutritional and metabolic disease     H/O hypercholesterolemia    Personal history of other mental and behavioral disorders     History of depression    Personal history of other specified conditions     History of snoring    Personal history of other specified conditions     History of shortness of breath    Personal history of other specified conditions 02/25/2019    History of nausea and vomiting   [2]   Allergies  Allergen Reactions    Amoxicillin Diarrhea    Tetracycline GI Upset   [3]   Past Surgical History:  Procedure Laterality Date    OTHER SURGICAL HISTORY  02/01/2019    Foot surgery

## 2025-06-06 ENCOUNTER — EVALUATION (OUTPATIENT)
Dept: OCCUPATIONAL THERAPY | Facility: HOSPITAL | Age: 70
End: 2025-06-06
Payer: COMMERCIAL

## 2025-06-06 DIAGNOSIS — M79.641 BILATERAL HAND PAIN: Primary | ICD-10-CM

## 2025-06-06 DIAGNOSIS — M79.642 BILATERAL HAND PAIN: Primary | ICD-10-CM

## 2025-06-06 PROCEDURE — L3908 WHO COCK-UP NONMOLDE PRE OTS: HCPCS

## 2025-06-06 PROCEDURE — 97165 OT EVAL LOW COMPLEX 30 MIN: CPT | Mod: GO

## 2025-06-06 PROCEDURE — 97035 APP MDLTY 1+ULTRASOUND EA 15: CPT | Mod: GO

## 2025-06-06 PROCEDURE — L3923 HFO WITHOUT JOINTS PRE CST: HCPCS

## 2025-06-06 ASSESSMENT — ENCOUNTER SYMPTOMS
OCCASIONAL FEELINGS OF UNSTEADINESS: 0
DEPRESSION: 0
LOSS OF SENSATION IN FEET: 0

## 2025-06-06 NOTE — PROGRESS NOTES
Occupational Therapy Evaluation    Patient Name: Natalie Moore  MRN: [id number]  Today's Date: 2025  Time Calculation  Start Time: 100  Stop Time: 014  Time Calculation (min): 45 min  Problem List Items Addressed This Visit    None  Visit Diagnoses         Codes      Bilateral hand pain    -  Primary M79.641, M79.642    Relevant Orders    Follow Up In Occupational Therapy             Insurance  Visit 1 of [place]  Authorization: not required  Insurance plan: Payor: [employer] DUAL COMPLETE / Plan: [employer] DUAL COMPLETE / Product Type: *No Product type* /   Medicare Certification: 2025            Endin25    Assessment Natalie Moore is a 68 yo woman who presents with a 1 year history of painful bilateral MF triggering and a 20 year history of bilateral carpal tunnel syndrome. She reports severe pain in both hands/wrists and tingling in the median nerve distributions, which make most I/ADL difficult. She demonstrates limited bilateral MF flexion with rubi triggering and mild edema about both hands. I fitted her with and issued bilateral prefab ring orthoses for the triggering and WHO for the carpal tunnel, and got her started on a home program that addresses AROM and median nerve compression. She will benefit from ongoing occupational therapy in order to get back to safe I/ADL performance and her prior level of function.     Plan therapeutic exercise, therapeutic activity, self-care, home management, manual therapy, neuromuscular coordination, vasopneumatic, electric stimulation, fluidotherapy, ultrasound, kinesiotaping, orthosis fabrication, wound/scar/edema care  Goals  In 8-10 weeks, Natalie will achieve the following goals:  She will be able to perform self-care, household, and leisure tasks with lower pain (1-3/10), 75% of the time.  She will improve finger range of motion in order to more easily perform self-care, household, and leisure tasks: D2-5 CEJA = 225 degrees.  She  will report a 25-50% decrease in bilateral MF triggering, allowing her to use her hands more functionally and with less pain.  She will decrease her QuickDASH score by 15-20 points (37 at eval).  Patient's goals for therapy: solving her bilateral MF triggering    Plan of care was developed with input and agreement by the patient  Frequency: 1 x Week  Duration: 12 Weeks    Precautions  Movement Restrictions: No:  Weight Bearing Status: As tolerated    Subjective   Has done opposition to achieve current level of L finger movement. Bilat CTS as well, doesn't always bother her. Diagnosed 20 years ago. Had splints, lost them. Currently numb in bilateral MF, RF. Occasionally wakes her at night, as does the triggering.    Prior level of function   Full functional bilateral hand use    Patient Intake and Medical History form reviewed    Pain  6-9/10 L MF constant, R more so at night; down to 4/10 after treatment    Objective   Outcome Measure QuickDASH: 37 / 59.09    Sensation  Currently numb in bilateral MF, RF (radial aspect). Occasionally wakes her at night, doesn't always bother her    Neuro exam: TBE  Radial nerve: 5/5 strength in thumb extension, sensation intact to dorsal surface of thumb/index web space  Median nerve: 5/5 strength in thumb abduction and opposition, sensation intact to palmar surface of index finger  Ulnar nerve: 5/5 strength in thumb/little finger approximation, sensation intact to palmar surface of little finger     Physical Exam  Wound/scar: none  Edema: moderate about L MF > R MF  Palpable nodule proximal to bilateral MF A1 pulley   Clive triggering with AROM    Posture  TBE    AROM  Right Hand (degrees)   MCP PIP DIP DPC (cm)   IF        MF    2 but touches palm (limited DIP flexion)   RF       SF       Thumb       Thumb RABD       Thumb PABD         Left Hand (degrees)   MCP PIP DIP DPC (cm)   IF        MF    4   RF       SF       Thumb       Thumb RABD       Thumb PABD           Strength   TBE  HAND (lbs)   R L   Dynamometer      Lateral Pinch     3jaw Pinch          Special Tests    Wrist:  Tinel's Sign at Wrist: POS bilaterally      TREATMENT  Education: home exercise program, plan of care, activity modification, pain management, and pertinent anatomy     Orthosis: Fitted her with and issued prefab size 12 oval 8 ring orthoses for bilateral MF triggering, which she will wear at night and during the day as able, to prevent full finger flexion thereby allowing the flexor tendon sheaths' inflammation to decrease, and prefab WHO for bilateral CTS symptoms, which she will wear at night.  Modalities: Ultrasound: small 3.3 MHz, 1.0 w/cm2, continuous, 6 minutes to each MF A1 pulley area for pain relief.   Therapeutic Exercise: Instructed her in her home program: moist heat followed by finger tendon gliding without blocking, and brachial plexus gliding, which she performed correctly; she'll do 1 set of 10 2x/day.    OT Evaluation Time Entry  OT Evaluation (Low) Time Entry: 25     OT Modalities Time Entry  Ultrasound Time Entry: 12      Hand/finger orthosis w/o joints prefab x 2  Wrist/hand orthosis, cock-up prefab x 2

## 2025-06-11 ENCOUNTER — APPOINTMENT (OUTPATIENT)
Dept: SLEEP MEDICINE | Facility: HOSPITAL | Age: 70
End: 2025-06-11
Payer: COMMERCIAL

## 2025-06-12 ENCOUNTER — TREATMENT (OUTPATIENT)
Dept: OCCUPATIONAL THERAPY | Facility: HOSPITAL | Age: 70
End: 2025-06-12
Payer: COMMERCIAL

## 2025-06-12 DIAGNOSIS — M79.641 BILATERAL HAND PAIN: ICD-10-CM

## 2025-06-12 DIAGNOSIS — M79.642 BILATERAL HAND PAIN: ICD-10-CM

## 2025-06-12 PROCEDURE — 97140 MANUAL THERAPY 1/> REGIONS: CPT | Mod: GO

## 2025-06-12 PROCEDURE — 97110 THERAPEUTIC EXERCISES: CPT | Mod: GO

## 2025-06-12 PROCEDURE — 97035 APP MDLTY 1+ULTRASOUND EA 15: CPT | Mod: GO

## 2025-06-12 NOTE — PROGRESS NOTES
Occupational Therapy Treatment    Patient Name: Natalie Moore  MRN: [id number]  Today's Date: 2025  Time Calculation  Start Time: 0115  Stop Time: 0200  Time Calculation (min): 45 min  Problem List Items Addressed This Visit    None  Visit Diagnoses         Codes      Bilateral hand pain     M79.641, M79.642             Insurance  Visit 2 of MN  Authorization: not required  Insurance plan: Payor: [employer] DUAL COMPLETE / Plan: [employer] DUAL COMPLETE / Product Type: *No Product type* /   Medicare Certification: 2025            Endin25    Assessment  Decreased triggering and pain bilaterally    Plan therapeutic exercise, therapeutic activity, self-care, home management, manual therapy, neuromuscular coordination, vasopneumatic, electric stimulation, fluidotherapy, ultrasound, kinesiotaping, orthosis fabrication, wound/scar/edema care  Goals  In 8-10 weeks, Natalie will achieve the following goals:  She will be able to perform self-care, household, and leisure tasks with lower pain (1-3/10), 75% of the time.  She will improve finger range of motion in order to more easily perform self-care, household, and leisure tasks: D2-5 CEJA = 225 degrees.  She will report a 25-50% decrease in bilateral MF triggering, allowing her to use her hands more functionally and with less pain.  She will decrease her QuickDASH score by 15-20 points (37 at eval).  Patient's goals for therapy: solving her bilateral MF triggering    Plan of care was developed with input and agreement by the patient  Frequency: 1 x Week  Duration: 12 Weeks    Precautions  Movement Restrictions: No:  Weight Bearing Status: As tolerated    Subjective   Natalie has worn the oval 8 orthoses as directed. The R MF is no longer triggering, swelling has decreased, and it fully flexes/extends. The L MF is no longer triggering, but it is still somewhat swollen and the PIP/DIP joints don't fully flex, though they do fully  extend.    Prior level of function   Full functional bilateral hand use    Patient Intake and Medical History form reviewed    Pain  5/10 L MF volar proximal phalanx still constant (lower than last week), 4/10 after tx  0/10 R MF also did not hurt last night    Objective   Outcome Measure QuickDASH: 37 / 59.09    Sensation  Currently numb in bilateral MF, RF (radial aspect). Occasionally wakes her at night, doesn't always bother her    Neuro exam: TBE  Radial nerve: 5/5 strength in thumb extension, sensation intact to dorsal surface of thumb/index web space  Median nerve: 5/5 strength in thumb abduction and opposition, sensation intact to palmar surface of index finger  Ulnar nerve: 5/5 strength in thumb/little finger approximation, sensation intact to palmar surface of little finger     Physical Exam  Wound/scar: none  Edema: moderate about L MF > R MF  Palpable nodule proximal to bilateral MF A1 pulley   No longer triggering with AROM    Posture  TBE    AROM  Right Hand (degrees)   MCP PIP DIP DPC (cm)   IF        MF      0   RF       SF         Left Hand (degrees)   MCP PIP DIP DPC (cm)   IF        MF    4   RF       SF           Strength  TBE  HAND (lbs)   R L   Dynamometer      Lateral Pinch     3jaw Pinch          Special Tests    Wrist:  Tinel's Sign at Wrist: POS bilaterally      TREATMENT  Education: home exercise program, plan of care, activity modification, pain management, and pertinent anatomy      Modalities: Ultrasound: small 3.3 MHz, 1.0 w/cm2, continuous, 6 minutes to each MF A1 pulley area for pain relief.   Manual: Trigger point releases about bilateral hands, intrinsic hand muscle stretching for pain relief and increased ROM  Therapeutic Exercise: Reassessed AROM, edema, symptoms, functional status, HEP and compliance, orthosis and compliance.  She performed finger tendon gliding without blocking and brachial plexus gliding correctly.    HEP: moist heat followed by finger tendon gliding  without blocking, brachial plexus gliding; each 1 set of 10 2x/day.  Orthoses: prefab size 12 oval 8 ring orthoses for bilateral MF triggering, which she will wear at night and during the day as able, to prevent full finger flexion thereby allowing the flexor tendon sheaths' inflammation to decrease, and prefab WHO for bilateral CTS symptoms, which she will wear at night.       OT Therapeutic Procedures Time Entry  Therapeutic Exercise Time Entry: 16  Manual Therapy Time Entry: 16  OT Modalities Time Entry  Ultrasound Time Entry: 13      Hand/finger orthosis w/o joints prefab x 2  Wrist/hand orthosis, cock-up prefab x 2

## 2025-06-13 ENCOUNTER — APPOINTMENT (OUTPATIENT)
Dept: PHYSICAL THERAPY | Facility: HOSPITAL | Age: 70
End: 2025-06-13
Payer: COMMERCIAL

## 2025-06-17 ENCOUNTER — DOCUMENTATION (OUTPATIENT)
Dept: OCCUPATIONAL THERAPY | Facility: HOSPITAL | Age: 70
End: 2025-06-17
Payer: COMMERCIAL

## 2025-06-18 ENCOUNTER — TREATMENT (OUTPATIENT)
Dept: OCCUPATIONAL THERAPY | Facility: HOSPITAL | Age: 70
End: 2025-06-18
Payer: COMMERCIAL

## 2025-06-18 DIAGNOSIS — M79.642 BILATERAL HAND PAIN: ICD-10-CM

## 2025-06-18 DIAGNOSIS — M79.641 BILATERAL HAND PAIN: ICD-10-CM

## 2025-06-18 PROCEDURE — 97140 MANUAL THERAPY 1/> REGIONS: CPT | Mod: GO

## 2025-06-18 PROCEDURE — 97035 APP MDLTY 1+ULTRASOUND EA 15: CPT | Mod: GO

## 2025-06-18 NOTE — PROGRESS NOTES
Occupational Therapy Treatment    Patient Name: Natalie Moore  MRN: [id number]  Today's Date: 2025  Time Calculation  Start Time: 1030  Stop Time: 1110  Time Calculation (min): 40 min  Problem List Items Addressed This Visit    None  Visit Diagnoses         Codes      Bilateral hand pain     M79.641, M79.642             Insurance  Visit 3 of [place]  Authorization: not required  Insurance plan: Payor: [employer] DUAL COMPLETE / Plan: [employer] DUAL COMPLETE / Product Type: *No Product type* /   Medicare Certification: 2025            Endin25    Assessment  Ongoing triggering and pain bilaterally when not wearing Oval-8 orthoses. Hands feel better after treatment    Plan compressive sleeve for L MF, reassess sensation; therapeutic exercise, therapeutic activity, self-care, home management, manual therapy, neuromuscular coordination, vasopneumatic, electric stimulation, fluidotherapy, ultrasound, kinesiotaping, orthosis fabrication, wound/scar/edema care  Goals  In 8-10 weeks, Natalie will achieve the following goals:  She will be able to perform self-care, household, and leisure tasks with lower pain (1-3/10), 75% of the time.  She will improve finger range of motion in order to more easily perform self-care, household, and leisure tasks: D2-5 CEJA = 225 degrees.  She will report a 25-50% decrease in bilateral MF triggering, allowing her to use her hands more functionally and with less pain.  She will decrease her QuickDASH score by 15-20 points (37 at eval).  Patient's goals for therapy: solving her bilateral MF triggering    Plan of care was developed with input and agreement by the patient  Frequency: 1 x Week  Duration: 12 Weeks    Precautions  Movement Restrictions: No:  Weight Bearing Status: As tolerated    Subjective   Natalie has worn the oval 8 orthoses as directed. She states that she is still experiencing pain and triggering, but did not specify if this is occurring with  both MF.    Prior level of function   Full functional bilateral hand use    Patient Intake and Medical History form reviewed    Pain  No number given    Objective   Outcome Measure QuickDASH: 37 / 59.09    Sensation  Currently numb in bilateral MF, RF (radial aspect). Occasionally wakes her at night, doesn't always bother her    Neuro exam: TBE  Radial nerve: 5/5 strength in thumb extension, sensation intact to dorsal surface of thumb/index web space  Median nerve: 5/5 strength in thumb abduction and opposition, sensation intact to palmar surface of index finger  Ulnar nerve: 5/5 strength in thumb/little finger approximation, sensation intact to palmar surface of little finger     Physical Exam  Wound/scar: none  Edema: moderate about L MF > R MF  Palpable nodule proximal to bilateral MF A1 pulley   L MF triggered with AROM in clinic today    Posture  TBE    AROM  Right Hand (degrees)   MCP PIP DIP DPC (cm)   IF        MF      0   RF       SF         Left Hand (degrees)   MCP PIP DIP DPC (cm)   IF        MF    2   RF       SF           Strength  TBE  HAND (lbs)   R L   Dynamometer      Lateral Pinch     3jaw Pinch          Special Tests    Wrist:  Tinel's Sign at Wrist: POS bilaterally      TREATMENT  Education: home exercise program, plan of care, activity modification, pain management, and pertinent anatomy      Modalities: Ultrasound: small 3.3 MHz, 1.0 w/cm2, continuous, 6 minutes to each MF A1 pulley area for pain relief.   Manual: Reassessed AROM, edema, symptoms, functional status, HEP and compliance, orthosis and compliance. Trigger point releases about bilateral hands, intrinsic hand muscle stretching for pain relief and increased ROM      HEP: moist heat followed by finger tendon gliding without blocking, brachial plexus gliding; each 1 set of 10 2x/day.  Orthoses: prefab size 12 oval 8 ring orthoses for bilateral MF triggering, which she will wear at night and during the day as able, to prevent full  finger flexion thereby allowing the flexor tendon sheaths' inflammation to decrease, and prefab WHO for bilateral CTS symptoms, which she will wear at night.       OT Therapeutic Procedures Time Entry  Manual Therapy Time Entry: 25  OT Modalities Time Entry  Ultrasound Time Entry: 15

## 2025-06-25 ENCOUNTER — TREATMENT (OUTPATIENT)
Dept: OCCUPATIONAL THERAPY | Facility: HOSPITAL | Age: 70
End: 2025-06-25
Payer: COMMERCIAL

## 2025-06-25 DIAGNOSIS — M79.642 BILATERAL HAND PAIN: ICD-10-CM

## 2025-06-25 DIAGNOSIS — M79.641 BILATERAL HAND PAIN: ICD-10-CM

## 2025-06-25 PROCEDURE — 97140 MANUAL THERAPY 1/> REGIONS: CPT | Mod: GO

## 2025-06-25 PROCEDURE — 97035 APP MDLTY 1+ULTRASOUND EA 15: CPT | Mod: GO

## 2025-06-25 NOTE — PROGRESS NOTES
Occupational Therapy Treatment    Patient Name: Natalie Moore  MRN: [id number]  Today's Date: 2025  Time Calculation  Start Time: 1115  Stop Time: 1200  Time Calculation (min): 45 min  Problem List Items Addressed This Visit    None  Visit Diagnoses         Codes      Bilateral hand pain     M79.641, M79.642             Insurance  Visit 4 of MN  Authorization: not required  Insurance plan: Payor: [employer] DUAL COMPLETE / Plan: [employer] DUAL COMPLETE / Product Type: *No Product type* /   Medicare Certification: 2025            Endin25    Assessment  R MF no longer triggering but L MF triggers less often and is less painful. Her CTS symptoms have decreased.    Plan therapeutic exercise, therapeutic activity, self-care, home management, manual therapy, neuromuscular coordination, vasopneumatic, electric stimulation, fluidotherapy, ultrasound, kinesiotaping, orthosis fabrication, wound/scar/edema care  Goals  In 8-10 weeks, Natalie will achieve the following goals:  She will be able to perform self-care, household, and leisure tasks with lower pain (1-3/10), 75% of the time.  She will improve finger range of motion in order to more easily perform self-care, household, and leisure tasks: D2-5 CEJA = 225 degrees.  She will report a 25-50% decrease in bilateral MF triggering, allowing her to use her hands more functionally and with less pain.  She will decrease her QuickDASH score by 15-20 points (37 at eval).  Patient's goals for therapy: solving her bilateral MF triggering    Plan of care was developed with input and agreement by the patient  Frequency: 1 x Week  Duration: 12 Weeks    Precautions  Movement Restrictions: No:  Weight Bearing Status: As tolerated    Subjective   Natalie has worn the oval 8 orthoses as directed. She states that she is still experiencing pain and triggering, but did not specify if this is occurring with both MF.    Prior level of function   Full  functional bilateral hand use    Patient Intake and Medical History form reviewed    Pain  4/10 L MF only    Objective   Outcome Measure QuickDASH: 37 / 59.09    Sensation  Currently no numbness in bilateral MF, RF (radial aspect). Occasionally wakes her at night, doesn't always bother her (used to be every night).  She has not used her R IF since she lacerated it over 20 years ago, and it is therefore stiff and weak.    Neuro exam: WNL  Radial nerve: 5/5 strength in thumb extension, sensation intact to dorsal surface of thumb/index web space  Median nerve: 5/5 strength in thumb abduction and opposition, sensation intact to palmar surface of index finger  Ulnar nerve: 5/5 strength in thumb/little finger approximation, sensation intact to palmar surface of little finger     Physical Exam  Wound/scar: none  Edema: moderate about L MF > R MF  Palpable nodule proximal to bilateral MF A1 pulley   L MF triggered with AROM in clinic today    Posture  TBE    AROM  Right Hand (degrees)   MCP PIP DIP DPC (cm)   IF        MF      0   RF       SF         Left Hand (degrees)   MCP PIP DIP DPC (cm)   IF        MF    1.5 but tip to palm   RF       SF           Strength    HAND (lbs)   R L   Dynamometer  35 22   Lateral Pinch 7.75 12.75   3jaw Pinch 6.5 11        Special Tests    Wrist:  Tinel's Sign at Wrist: POS bilaterally      TREATMENT  Education: home exercise program, plan of care, activity modification, pain management, and pertinent anatomy      Modalities: Ultrasound: small 3.3 MHz, 1.0 w/cm2, continuous, 6 minutes to each MF A1 pulley area for pain relief.   Manual: Reassessed AROM, edema, symptoms, functional status, HEP and compliance, orthosis and compliance. Assessed bilateral  strength. Natalie's strength is below average for women her age, so I instructed her in isometric  and pinch strengthening (added to HEP). Trigger point releases about bilateral hands, intrinsic hand muscle stretching for pain  relief and increased ROM. Fitted her with and issued XL compression sleeves for bilateral MF      HEP: moist heat followed by finger tendon gliding without blocking, brachial plexus gliding, isometric /pinch strengthening; each 1 set of 10 2x/day.  Orthoses: prefab size 12 oval 8 ring orthoses for bilateral MF triggering, which she will wear at night and during the day as able, to prevent full finger flexion thereby allowing the flexor tendon sheaths' inflammation to decrease, and prefab WHO for bilateral CTS symptoms, which she will wear at night.       OT Therapeutic Procedures Time Entry  Manual Therapy Time Entry: 33  OT Modalities Time Entry  Ultrasound Time Entry: 12

## 2025-07-01 ENCOUNTER — TREATMENT (OUTPATIENT)
Dept: OCCUPATIONAL THERAPY | Facility: HOSPITAL | Age: 70
End: 2025-07-01
Payer: COMMERCIAL

## 2025-07-01 DIAGNOSIS — M79.641 BILATERAL HAND PAIN: ICD-10-CM

## 2025-07-01 DIAGNOSIS — M79.642 BILATERAL HAND PAIN: ICD-10-CM

## 2025-07-01 PROCEDURE — 97035 APP MDLTY 1+ULTRASOUND EA 15: CPT | Mod: GO

## 2025-07-01 PROCEDURE — 97140 MANUAL THERAPY 1/> REGIONS: CPT | Mod: GO

## 2025-07-01 NOTE — PROGRESS NOTES
Occupational Therapy Treatment    Patient Name: Natalie Moore  MRN: [id number]  Today's Date: 2025  Time Calculation  Start Time: 1135  Stop Time: 1215  Time Calculation (min): 40 min  Problem List Items Addressed This Visit    None  Visit Diagnoses         Codes      Bilateral hand pain     M79.641, M79.642             Insurance  Visit 5 of MN  Authorization: not required  Insurance plan: Payor: [employer] DUAL COMPLETE / Plan: [employer] DUAL COMPLETE / Product Type: *No Product type* /   Medicare Certification: 2025            Endin25    Assessment  L MF is triggering even while wearing the prefab oval-8 orthosis     Plan therapeutic exercise, therapeutic activity, self-care, home management, manual therapy, neuromuscular coordination,  fluidotherapy, ultrasound, kinesiotaping, orthosis fabrication, wound/scar/edema care  Goals  In 8-10 weeks, Natalie will achieve the following goals:  She will be able to perform self-care, household, and leisure tasks with lower pain (1-3/10), 75% of the time.  She will improve finger range of motion in order to more easily perform self-care, household, and leisure tasks: D2-5 CEJA = 225 degrees.  She will report a 25-50% decrease in bilateral MF triggering, allowing her to use her hands more functionally and with less pain.  She will decrease her QuickDASH score by 15-20 points (37 at eval).  Patient's goals for therapy: solving her bilateral MF triggering    Plan of care was developed with input and agreement by the patient  Frequency: 1 x Week  Duration: 12 Weeks    Precautions  Movement Restrictions: No:  Weight Bearing Status: As tolerated    Subjective   Natalie demonstrated that her L MF triggers even while wearing the oval 8 orthosis.    Prior level of function   Full functional bilateral hand use    Patient Intake and Medical History form reviewed    Pain  0/10     Objective   Outcome Measure QuickDASH: 37 /  59.09    Sensation  Currently no numbness in bilateral MF, RF (radial aspect). Occasionally wakes her at night, doesn't always bother her (used to be every night).  She has not used her R IF since she lacerated it over 20 years ago, and it is therefore stiff and weak.    Neuro exam: WNL  Radial nerve: 5/5 strength in thumb extension, sensation intact to dorsal surface of thumb/index web space  Median nerve: 5/5 strength in thumb abduction and opposition, sensation intact to palmar surface of index finger  Ulnar nerve: 5/5 strength in thumb/little finger approximation, sensation intact to palmar surface of little finger     Physical Exam  Wound/scar: none  Edema: moderate about L MF > R MF  Palpable nodule proximal to bilateral MF A1 pulley   L MF triggered with AROM in clinic today    Posture  TBE    AROM  Right Hand (degrees)   MCP PIP DIP DPC (cm)   IF        MF      0   RF       SF         Left Hand (degrees)   MCP PIP DIP DPC (cm)   IF        MF    1.5 but tip to palm   RF       SF           Strength    HAND (lbs)   R L   Dynamometer  35 22   Lateral Pinch 7.75 12.75   3jaw Pinch 6.5 11        Special Tests    Wrist:  Tinel's Sign at Wrist: POS bilaterally      TREATMENT  Education: home exercise program, plan of care, activity modification, pain management, and pertinent anatomy      Modalities: Ultrasound: small 3.3 MHz, 1.0 w/cm2, continuous, 6 minutes to each MF A1 pulley area for pain relief.   Manual: Reassessed AROM, edema, symptoms, functional status, HEP and compliance, orthosis and compliance. Manual massage to A-1 pulley areas after ultrasound. Trigger point releases about bilateral hands, intrinsic hand muscle stretching for pain relief and increased ROM. Instructed her in applying 2 layers of tape (issued) to her L MF PIP joint under the oval-8 orthosis, which prevents her finger from flexing far enough to trigger.    HEP: moist heat followed by finger tendon gliding without blocking, brachial  plexus gliding, isometric /pinch strengthening; each 1 set of 10 2x/day.  Orthoses: prefab size 12 oval 8 ring orthoses for bilateral MF triggering, which she will wear at night and during the day as able, to prevent full finger flexion thereby allowing the flexor tendon sheaths' inflammation to decrease, and prefab WHO for bilateral CTS symptoms, which she will wear at night.       OT Therapeutic Procedures Time Entry  Manual Therapy Time Entry: 25  OT Modalities Time Entry  Ultrasound Time Entry: 15

## 2025-07-03 ENCOUNTER — APPOINTMENT (OUTPATIENT)
Dept: PHYSICAL THERAPY | Facility: HOSPITAL | Age: 70
End: 2025-07-03
Payer: COMMERCIAL

## 2025-07-08 ENCOUNTER — DOCUMENTATION (OUTPATIENT)
Dept: OCCUPATIONAL THERAPY | Facility: HOSPITAL | Age: 70
End: 2025-07-08
Payer: COMMERCIAL

## 2025-07-15 ENCOUNTER — EVALUATION (OUTPATIENT)
Dept: PHYSICAL THERAPY | Facility: HOSPITAL | Age: 70
End: 2025-07-15
Payer: COMMERCIAL

## 2025-07-15 DIAGNOSIS — G89.29 CHRONIC BILATERAL LOW BACK PAIN WITHOUT SCIATICA: Primary | ICD-10-CM

## 2025-07-15 DIAGNOSIS — M54.50 CHRONIC BILATERAL LOW BACK PAIN WITHOUT SCIATICA: Primary | ICD-10-CM

## 2025-07-15 PROCEDURE — 97110 THERAPEUTIC EXERCISES: CPT | Mod: GP

## 2025-07-15 PROCEDURE — 97161 PT EVAL LOW COMPLEX 20 MIN: CPT | Mod: GP

## 2025-07-15 ASSESSMENT — ENCOUNTER SYMPTOMS
LOSS OF SENSATION IN FEET: 0
DEPRESSION: 0
OCCASIONAL FEELINGS OF UNSTEADINESS: 0
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
## 2025-07-28 ENCOUNTER — APPOINTMENT (OUTPATIENT)
Dept: PHYSICAL THERAPY | Facility: HOSPITAL | Age: 70
End: 2025-07-28
Payer: COMMERCIAL

## 2025-07-29 DIAGNOSIS — Z00.00 HEALTHCARE MAINTENANCE: ICD-10-CM

## 2025-07-29 DIAGNOSIS — E78.5 HYPERLIPIDEMIA, UNSPECIFIED HYPERLIPIDEMIA TYPE: ICD-10-CM

## 2025-07-30 ENCOUNTER — TREATMENT (OUTPATIENT)
Dept: OCCUPATIONAL THERAPY | Facility: HOSPITAL | Age: 70
End: 2025-07-30
Payer: COMMERCIAL

## 2025-07-30 ENCOUNTER — TREATMENT (OUTPATIENT)
Dept: PHYSICAL THERAPY | Facility: HOSPITAL | Age: 70
End: 2025-07-30
Payer: COMMERCIAL

## 2025-07-30 DIAGNOSIS — M79.641 BILATERAL HAND PAIN: ICD-10-CM

## 2025-07-30 DIAGNOSIS — M54.50 CHRONIC BILATERAL LOW BACK PAIN WITHOUT SCIATICA: ICD-10-CM

## 2025-07-30 DIAGNOSIS — G89.29 CHRONIC BILATERAL LOW BACK PAIN WITHOUT SCIATICA: ICD-10-CM

## 2025-07-30 DIAGNOSIS — M79.642 BILATERAL HAND PAIN: ICD-10-CM

## 2025-07-30 PROCEDURE — 97110 THERAPEUTIC EXERCISES: CPT | Mod: GP,CQ

## 2025-07-30 PROCEDURE — 97035 APP MDLTY 1+ULTRASOUND EA 15: CPT | Mod: GO

## 2025-07-30 PROCEDURE — 97110 THERAPEUTIC EXERCISES: CPT | Mod: GO

## 2025-07-30 RX ORDER — CELECOXIB 200 MG/1
200 CAPSULE ORAL
Qty: 30 CAPSULE | Refills: 11 | Status: SHIPPED | OUTPATIENT
Start: 2025-07-30

## 2025-07-30 RX ORDER — ATORVASTATIN CALCIUM 40 MG/1
40 TABLET, FILM COATED ORAL
Qty: 30 TABLET | Refills: 11 | Status: SHIPPED | OUTPATIENT
Start: 2025-07-30

## 2025-07-30 NOTE — PROGRESS NOTES
Physical Therapy Treatment    Patient Name:Natalie Moore  MRN:[id number]  Today's Date:7/30/2025  Referred by: Karri Horn  Time Calculation  Start Time: 1122  Stop Time: 1159  Time Calculation (min): 37 min    Therapy Diagnosis  Problem List Items Addressed This Visit           ICD-10-CM    Chronic bilateral low back pain without sciatica M54.50, G89.29       Assessment:   Pt with excellent tolerance of treatment. Pt absent of discomfort during session. Pt focusing on flexion biased therex program. Pt also performing light extension with PPT, bridges and prone on elbows with upward gaze following flexion biased stretches and exercises.       Plan:    1 time(s) a week, for  8-10  weeks    Flexion biased lumbar program, light strengthening of the glutes and hips, trial flexion program, trial TENS for back pain, manual techniques PRN     Insurance  Visit number: 2  Approved number of visits: 30V   Auth required: No  Onset Date: 9/11/2024  Payor: [employer] COMPLETE / Plan: [employer] DUAL COMPLETE / Product Type: *No Product type* /      Precautions/Fall Risk:  Fall Risk: Low based on professional judgment  Pacemaker: no    Seizures: No    Post Op Movement/Restrictions: No  Weight Bearing Status: As tolerated    Subjective/Pain: Pt reports she is feeling good today and has no c/o pain.  Current Pain Level (0-10): 0    HEP Compliance: Good    Objective/Outcome Measures:    Demo and min cues for therex    Absent of discomfort    Treatment  Therapeutic Procedure PT Therapeutic Procedures Time Entry  Therapeutic Exercise Time Entry: 37 minutes      Therapeutic Exercise 37 minutes  8 mins recumbent bicycle lvl 2  3 mins heel slides on SB  2 mins LTR on SB  3 mins hook lying marches  Hook lying PPT 10 x 5s holds  Bridges 2 x 10  Jose Pose <> Prone on elbows 2 x 30s holds ea      Manual Therapy   minutes      Neuromuscular Re-ed   minutes      Gait Training   minutes    Modalities   Vasopneumatic  "Device       minutes  Electrical Stimulation            minutes  Ultrasound                     minutes  Iontophoresis                     minutes  Cold Pack                        minutes  Mechanical Traction           minutes    OP EDUCATION:       Goals:    Patient's Goal for Treatment: Relieve pain, Improve mobility, Reduce symptoms, and Return to prior level of function     Lumbar Spine Goals:  By discharge, patient will:  Demonstrate independence with home exercise program.  Demonstrate proper seated/standing posture for >/= 75% of the time without familiar s/s.   Tolerate increased exercise without adverse reaction.  Increase strength of B hip extension to 4+/5 in order to improve the ability to perform essential ADLs  Increase strength of B hip flexion to 5/5 in order to improve the ability to perform essential ADLs  Report decrease in pain by >= 2 points to meet MCID  Decrease score of Modified PAKO by > 6 points to meet the MCID  Ascend and descend 13 stairs without an increase in symptoms  Ambulate .25 miles on treadmill without an increase in symptoms  Be able to sleep through the night without an increase in symptoms  Be able to perform the ADL of lifting an 8 lb object from the ground without an increase or production of symptoms  Patient stated goal: \"sleep through the night\"          "

## 2025-07-30 NOTE — PROGRESS NOTES
Occupational Therapy Treatment    Patient Name: Natalie Moore  MRN: [id number]  Today's Date: 2025  Time Calculation  Start Time: 0900  Stop Time: 09  Time Calculation (min): 38 min  Problem List Items Addressed This Visit    None  Visit Diagnoses         Codes      Bilateral hand pain     M79.641, M79.642             Insurance  Visit 6 of [place]  Authorization: not required  Insurance plan: Payor: [employer] DUAL COMPLETE / Plan: [employer] DUAL COMPLETE / Product Type: *No Product type* /   Medicare Certification: 2025            Endin25    Assessment  Natalie's symptoms have greatly improved. She has met many of her goals.    Plan therapeutic exercise, therapeutic activity, self-care, home management, manual therapy, neuromuscular coordination,  fluidotherapy, ultrasound, kinesiotaping, orthosis fabrication, wound/scar/edema care  Goals  In 8-10 weeks, Natalie will achieve the following goals:  She will be able to perform self-care, household, and leisure tasks with lower pain (1-3/10), 75% of the time. MET  She will improve finger range of motion in order to more easily perform self-care, household, and leisure tasks: D2-5 CEJA = 225 degrees. MET  She will report a 25-50% decrease in bilateral MF triggering, allowing her to use her hands more functionally and with less pain.  MET (25% less)   She will decrease her QuickDASH score by 15-20 points (37 at eval).  Patient's goals for therapy: solving her bilateral MF triggering    Plan of care was developed with input and agreement by the patient  Frequency: 1 x Week  Duration: 12 Weeks    Precautions  Movement Restrictions: No:  Weight Bearing Status: As tolerated    Subjective   Natalie says that her L MF triggers some days and some days it doesn't. She wears the oval 8 orthosis and tape around the PIP daily and nightly. She also soaks in epsom salt. She bought putty and likes using it; it only causes the L MF to trigger  occasionally. She no longer wakes from sleep due to CTS symptoms because she wears her orthoses nightly.    Prior level of function   Full functional bilateral hand use    Patient Intake and Medical History form reviewed    Pain  0/10     Objective   Outcome Measure QuickDASH: 37 / 59.09    Sensation  Currently no numbness in bilateral MF, RF (radial aspect). Occasionally wakes her at night, doesn't always bother her (used to be every night).  She has not used her R IF since she lacerated it over 20 years ago, and it is therefore stiff and weak.    Neuro exam: WNL  Radial nerve: 5/5 strength in thumb extension, sensation intact to dorsal surface of thumb/index web space  Median nerve: 5/5 strength in thumb abduction and opposition, sensation intact to palmar surface of index finger  Ulnar nerve: 5/5 strength in thumb/little finger approximation, sensation intact to palmar surface of little finger     Physical Exam  Wound/scar: none  Edema: moderate about L MF > R MF  Palpable nodule proximal to bilateral MF A1 pulley   L MF triggered with AROM in clinic today    Posture  TBE    AROM  Right Hand (degrees)   MCP PIP DIP DPC (cm)   IF        MF      0   RF       SF         Left Hand (degrees)   MCP PIP DIP DPC (cm)   IF        MF    1.5 but tip to palm   RF       SF           Strength    HAND (lbs)   R L   Dynamometer  35 22   Lateral Pinch 7.75 12.75   3jaw Pinch 6.5 11        Special Tests    Wrist:  Tinel's Sign at Wrist: POS bilaterally      TREATMENT  Education: home exercise program, plan of care, activity modification, pain management, and pertinent anatomy      Modalities: Ultrasound: small 3.3 MHz, 1.0 w/cm2, continuous, 6 minutes to each MF A1 pulley area for pain relief.   Manual: Reassessed AROM, edema, symptoms, functional status, HEP and compliance, orthosis and compliance. Manual massage to A-1 pulley areas after ultrasound. Trigger point releases about bilateral hands, intrinsic hand muscle  stretching for pain relief and increased ROM. Instructed her in brachial plexus gliding because she didn't remember me telling her about it at the start of this OT episode.    HEP: moist heat followed by finger tendon gliding without blocking, brachial plexus gliding, isometric /pinch strengthening; each 1 set of 10 2x/day.  Orthoses: prefab size 12 oval 8 ring orthoses for bilateral MF triggering, which she will wear at night and during the day as able, to prevent full finger flexion thereby allowing the flexor tendon sheaths' inflammation to decrease, and prefab WHO for bilateral CTS symptoms, which she will wear at night.       OT Therapeutic Procedures Time Entry  Therapeutic Exercise Time Entry: 18  OT Modalities Time Entry  Ultrasound Time Entry: 20

## 2025-08-06 DIAGNOSIS — Z12.31 ENCOUNTER FOR SCREENING MAMMOGRAM FOR BREAST CANCER: ICD-10-CM

## 2025-08-12 ENCOUNTER — TREATMENT (OUTPATIENT)
Dept: PHYSICAL THERAPY | Facility: HOSPITAL | Age: 70
End: 2025-08-12
Payer: COMMERCIAL

## 2025-08-12 ENCOUNTER — TREATMENT (OUTPATIENT)
Dept: OCCUPATIONAL THERAPY | Facility: HOSPITAL | Age: 70
End: 2025-08-12
Payer: COMMERCIAL

## 2025-08-12 DIAGNOSIS — M79.642 BILATERAL HAND PAIN: ICD-10-CM

## 2025-08-12 DIAGNOSIS — G89.29 CHRONIC BILATERAL LOW BACK PAIN WITHOUT SCIATICA: ICD-10-CM

## 2025-08-12 DIAGNOSIS — M79.641 BILATERAL HAND PAIN: ICD-10-CM

## 2025-08-12 DIAGNOSIS — M54.50 CHRONIC BILATERAL LOW BACK PAIN WITHOUT SCIATICA: ICD-10-CM

## 2025-08-12 PROCEDURE — 97035 APP MDLTY 1+ULTRASOUND EA 15: CPT | Mod: GO

## 2025-08-12 PROCEDURE — 97140 MANUAL THERAPY 1/> REGIONS: CPT | Mod: GO

## 2025-08-12 PROCEDURE — 97140 MANUAL THERAPY 1/> REGIONS: CPT | Mod: GP,CQ

## 2025-08-12 PROCEDURE — 97110 THERAPEUTIC EXERCISES: CPT | Mod: GP,CQ

## 2025-08-15 ENCOUNTER — HOSPITAL ENCOUNTER (OUTPATIENT)
Dept: RADIOLOGY | Facility: CLINIC | Age: 70
Discharge: HOME | End: 2025-08-15
Payer: COMMERCIAL

## 2025-08-15 DIAGNOSIS — Z12.31 ENCOUNTER FOR SCREENING MAMMOGRAM FOR BREAST CANCER: ICD-10-CM

## 2025-08-15 PROCEDURE — 77067 SCR MAMMO BI INCL CAD: CPT

## 2025-08-26 ENCOUNTER — TREATMENT (OUTPATIENT)
Dept: PHYSICAL THERAPY | Facility: HOSPITAL | Age: 70
End: 2025-08-26
Payer: COMMERCIAL

## 2025-08-26 DIAGNOSIS — G89.29 CHRONIC BILATERAL LOW BACK PAIN WITHOUT SCIATICA: ICD-10-CM

## 2025-08-26 DIAGNOSIS — M54.50 CHRONIC BILATERAL LOW BACK PAIN WITHOUT SCIATICA: ICD-10-CM

## 2025-08-26 PROCEDURE — 97110 THERAPEUTIC EXERCISES: CPT | Mod: GP,CQ

## 2025-11-20 ENCOUNTER — APPOINTMENT (OUTPATIENT)
Dept: PRIMARY CARE | Facility: CLINIC | Age: 70
End: 2025-11-20
Payer: COMMERCIAL